# Patient Record
Sex: MALE | Race: WHITE | NOT HISPANIC OR LATINO | Employment: FULL TIME | URBAN - METROPOLITAN AREA
[De-identification: names, ages, dates, MRNs, and addresses within clinical notes are randomized per-mention and may not be internally consistent; named-entity substitution may affect disease eponyms.]

---

## 2017-11-07 ENCOUNTER — ALLSCRIPTS OFFICE VISIT (OUTPATIENT)
Dept: OTHER | Facility: OTHER | Age: 37
End: 2017-11-07

## 2017-11-08 NOTE — PROGRESS NOTES
Assessment  1  Work history   ·   2  C6 radiculopathy (723 4) (M54 12)    Plan  C6 radiculopathy    · Start: DiazePAM 10 MG Oral Tablet (Valium); take one each night for muscle spasm up to  10 nights   · Start: PredniSONE 10 MG Oral Tablet; 4 pills at once daily with food for 2 days, 3 pills at  once daily for 2 days, 2 pills daily at once  for 2 days, one pill daily for 2 days  Need for prophylactic vaccination and inoculation against influenza    · Stop: Fluzone Quadrivalent Intramuscular Suspension    Patient has an irritation on his C6 nerve root either from his muscles or from his neck itself  So to begin we will do the followin  He will take prednisone as follows  Today Tuesday and tomorrow Wednesday 4 pills at once with food  Thursday and Friday 3 pills at once with food  Saturday and  2 pills at once with food  Monday and Tuesday 1 pill daily with food    2  He will take Valium at bedtime 10 mg and do so for at least 5 nights to see if it helps to relax his muscle tension especially in his trapezius muscle and helps the numbness go away  3   If he feels well and continues to improve he will just let time take care of it  If he does not feel any difference in 2 weeks he will come back, or if he feels worse he will call  Recheck as scheduled or as above     Discussion/Summary  Possible side effects of new medications were reviewed with the patient/guardian today1  The treatment plan was reviewed with the patient/guardian  The patient/guardian understands and agrees with the treatment plan1        1 Amended By: Yaya Duran; 2017 8:56 AM EST    Chief Complaint  Pt presents here with neck pain  History of Present Illness  HPI: About 2 and half weeks ago patient woke up in the morning with a sore neck never thought much of it   Claudene Bamberg later he had tingling in his thumb and his index fingerwe can have it stayed the samethen , today is Tuesday, he was okay until he was putting [employer] away  Suddenly his neck wrench did he could not move it in any direction  Monday, as it started to loosen up and it is a little better today  never had any injury to his neck but he did have shoulder reconstruction on the leftfeels a little weaker on the left, and he is right handed  Active Problems  1  Need for diphtheria-tetanus-pertussis (Tdap) vaccine, adult/adolescent (V06 1) (Z23)  2  Sebaceous cyst (706 2) (L72 3)  3  Tinea corporis (110 5) (B35 4)    Family History  Mother   1  Family history of macular degeneration (V19 19) (G48 027)  Father   2  Family history of     Social History   · Always uses seat belt   · Drinks coffee   · Drinks 2 cups a day   · Never a smoker   · No drug use   · Social alcohol use (Z78 9)   · Drinks 2-4 beers once a week   · Work history   ·   The social history was reviewed and updated today  Surgical History  1  History of Dental Surgery  2  History of Penis Circumcision No Clamp / Device / Dorsal Slit [place]  3  History of Shoulder Surgery    Current Meds  1  No Reported Medications Recorded    The medication list was reviewed and updated today  Allergies  1  No Known Drug Allergies  2  No Known Environmental Allergies  3  No Known Food Allergies    Vitals   Recorded: [id number] 08:26AM   Heart Rate 72   Respiration 16   Systolic 080, RUE, Sitting   Diastolic 88, RUE, Sitting   Height 5 ft 10 5 in   Weight 198 lb    BMI Calculated 28 01   BSA Calculated 2 09     Physical Exam  General  Patient in no acute distress, well appearing, well nourished and appears stated age    Mental status  Good judgment and insight, oriented to time person and place, recent and remote memory is intact, mood and affect are normal, cooperative, and patient is reasonable      Muscular system  C-spine some limited range of motion flexion and rotation left with active range of motion by full range of motion with passive range of motion  Upper extremity motor equal bilaterally +5/5  DTR equal bilaterally +2/4        Results/Data  PHQ-2 Adult Depression Screening [id number] 08:34AM User, s     Test Name Result Flag Reference   PHQ-2 Adult Depression Score 0     Over the last two weeks, how often have you been bothered by any of the following problems?   Little interest or pleasure in doing things: Not at all - 0  Feeling down, depressed, or hopeless: Not at all - 0   PHQ-2 Adult Depression Screening Negative         Signatures   Electronically signed by : Isela Hernandez DO; Nov 7 2017  8:56AM EST                       (Author)

## 2018-01-14 VITALS
HEART RATE: 72 BPM | HEIGHT: 71 IN | SYSTOLIC BLOOD PRESSURE: 122 MMHG | WEIGHT: 198 LBS | DIASTOLIC BLOOD PRESSURE: 88 MMHG | RESPIRATION RATE: 16 BRPM | BODY MASS INDEX: 27.72 KG/M2

## 2020-03-17 PROBLEM — M54.12 C6 RADICULOPATHY: Status: ACTIVE | Noted: 2017-11-07

## 2020-04-22 ENCOUNTER — TELEPHONE (OUTPATIENT)
Dept: FAMILY MEDICINE CLINIC | Facility: CLINIC | Age: 40
End: 2020-04-22

## 2020-11-05 ENCOUNTER — OFFICE VISIT (OUTPATIENT)
Dept: GASTROENTEROLOGY | Facility: CLINIC | Age: 40
End: 2020-11-05
Payer: COMMERCIAL

## 2020-11-05 VITALS
HEIGHT: 71 IN | TEMPERATURE: 97.9 F | SYSTOLIC BLOOD PRESSURE: 120 MMHG | WEIGHT: 175 LBS | BODY MASS INDEX: 24.5 KG/M2 | DIASTOLIC BLOOD PRESSURE: 82 MMHG

## 2020-11-05 DIAGNOSIS — Z86.010 PERSONAL HISTORY OF COLONIC POLYPS: ICD-10-CM

## 2020-11-05 DIAGNOSIS — K64.8 INTERNAL HEMORRHOIDS: ICD-10-CM

## 2020-11-05 DIAGNOSIS — K62.5 RECTAL BLEEDING: Primary | ICD-10-CM

## 2020-11-05 PROBLEM — Z86.0100 PERSONAL HISTORY OF COLONIC POLYPS: Status: ACTIVE | Noted: 2020-11-05

## 2020-11-05 PROCEDURE — 99203 OFFICE O/P NEW LOW 30 MIN: CPT | Performed by: INTERNAL MEDICINE

## 2020-11-23 ENCOUNTER — TELEMEDICINE (OUTPATIENT)
Dept: GASTROENTEROLOGY | Facility: CLINIC | Age: 40
End: 2020-11-23

## 2020-11-23 VITALS — WEIGHT: 175 LBS | HEIGHT: 71 IN | BODY MASS INDEX: 24.5 KG/M2

## 2020-11-23 DIAGNOSIS — Z86.010 HISTORY OF COLON POLYPS: Primary | ICD-10-CM

## 2020-11-23 RX ORDER — SODIUM PICOSULFATE, MAGNESIUM OXIDE, AND ANHYDROUS CITRIC ACID 10; 3.5; 12 MG/160ML; G/160ML; G/160ML
LIQUID ORAL
Qty: 1 BOTTLE | Refills: 0 | Status: SHIPPED | OUTPATIENT
Start: 2020-11-23 | End: 2020-11-30 | Stop reason: HOSPADM

## 2020-11-30 ENCOUNTER — HOSPITAL ENCOUNTER (OUTPATIENT)
Dept: GASTROENTEROLOGY | Facility: AMBULATORY SURGERY CENTER | Age: 40
Discharge: HOME | End: 2020-11-30
Payer: COMMERCIAL

## 2020-11-30 ENCOUNTER — ANESTHESIA (OUTPATIENT)
Dept: GASTROENTEROLOGY | Facility: AMBULATORY SURGERY CENTER | Age: 40
End: 2020-11-30

## 2020-11-30 ENCOUNTER — ANESTHESIA EVENT (OUTPATIENT)
Dept: GASTROENTEROLOGY | Facility: AMBULATORY SURGERY CENTER | Age: 40
End: 2020-11-30

## 2020-11-30 VITALS
HEIGHT: 70 IN | SYSTOLIC BLOOD PRESSURE: 118 MMHG | OXYGEN SATURATION: 99 % | WEIGHT: 175 LBS | TEMPERATURE: 98.2 F | DIASTOLIC BLOOD PRESSURE: 74 MMHG | BODY MASS INDEX: 25.05 KG/M2 | HEART RATE: 70 BPM | RESPIRATION RATE: 16 BRPM

## 2020-11-30 DIAGNOSIS — Z86.010 PERSONAL HISTORY OF COLONIC POLYPS: ICD-10-CM

## 2020-11-30 DIAGNOSIS — K62.5 RECTAL BLEEDING: ICD-10-CM

## 2020-11-30 PROBLEM — Z98.890 PONV (POSTOPERATIVE NAUSEA AND VOMITING): Status: ACTIVE | Noted: 2020-11-30

## 2020-11-30 PROBLEM — R11.2 PONV (POSTOPERATIVE NAUSEA AND VOMITING): Status: ACTIVE | Noted: 2020-11-30

## 2020-11-30 PROBLEM — Z87.891 FORMER SMOKER: Status: ACTIVE | Noted: 2020-11-30

## 2020-11-30 PROCEDURE — 45378 DIAGNOSTIC COLONOSCOPY: CPT | Performed by: INTERNAL MEDICINE

## 2020-11-30 RX ORDER — METOCLOPRAMIDE HYDROCHLORIDE 5 MG/ML
10 INJECTION INTRAMUSCULAR; INTRAVENOUS ONCE AS NEEDED
Status: CANCELLED | OUTPATIENT
Start: 2020-11-30

## 2020-11-30 RX ORDER — FENTANYL CITRATE/PF 50 MCG/ML
12.5 SYRINGE (ML) INJECTION
Status: CANCELLED | OUTPATIENT
Start: 2020-11-30

## 2020-11-30 RX ORDER — MEPERIDINE HYDROCHLORIDE 50 MG/ML
12.5 INJECTION INTRAMUSCULAR; INTRAVENOUS; SUBCUTANEOUS
Status: CANCELLED | OUTPATIENT
Start: 2020-11-30

## 2020-11-30 RX ORDER — ONDANSETRON 2 MG/ML
4 INJECTION INTRAMUSCULAR; INTRAVENOUS ONCE AS NEEDED
Status: CANCELLED | OUTPATIENT
Start: 2020-11-30

## 2020-11-30 RX ORDER — LABETALOL 20 MG/4 ML (5 MG/ML) INTRAVENOUS SYRINGE
5
Status: CANCELLED | OUTPATIENT
Start: 2020-11-30

## 2020-11-30 RX ORDER — PROPOFOL 10 MG/ML
INJECTION, EMULSION INTRAVENOUS AS NEEDED
Status: DISCONTINUED | OUTPATIENT
Start: 2020-11-30 | End: 2020-11-30

## 2020-11-30 RX ORDER — SODIUM CHLORIDE 9 MG/ML
75 INJECTION, SOLUTION INTRAVENOUS CONTINUOUS
Status: DISCONTINUED | OUTPATIENT
Start: 2020-11-30 | End: 2020-12-04 | Stop reason: HOSPADM

## 2020-11-30 RX ORDER — PROMETHAZINE HYDROCHLORIDE 25 MG/ML
6.25 INJECTION, SOLUTION INTRAMUSCULAR; INTRAVENOUS ONCE AS NEEDED
Status: CANCELLED | OUTPATIENT
Start: 2020-11-30

## 2020-11-30 RX ORDER — HYDRALAZINE HYDROCHLORIDE 20 MG/ML
5 INJECTION INTRAMUSCULAR; INTRAVENOUS
Status: CANCELLED | OUTPATIENT
Start: 2020-11-30

## 2020-11-30 RX ADMIN — PROPOFOL 30 MG: 10 INJECTION, EMULSION INTRAVENOUS at 13:46

## 2020-11-30 RX ADMIN — PROPOFOL 20 MG: 10 INJECTION, EMULSION INTRAVENOUS at 13:44

## 2020-11-30 RX ADMIN — PROPOFOL 20 MG: 10 INJECTION, EMULSION INTRAVENOUS at 13:41

## 2020-11-30 RX ADMIN — SODIUM CHLORIDE 75 ML/HR: 9 INJECTION, SOLUTION INTRAVENOUS at 13:31

## 2020-11-30 RX ADMIN — PROPOFOL 30 MG: 10 INJECTION, EMULSION INTRAVENOUS at 13:40

## 2020-11-30 RX ADMIN — PROPOFOL 20 MG: 10 INJECTION, EMULSION INTRAVENOUS at 13:48

## 2020-11-30 RX ADMIN — PROPOFOL 50 MG: 10 INJECTION, EMULSION INTRAVENOUS at 13:38

## 2020-11-30 RX ADMIN — PROPOFOL 20 MG: 10 INJECTION, EMULSION INTRAVENOUS at 13:52

## 2020-11-30 RX ADMIN — PROPOFOL 50 MG: 10 INJECTION, EMULSION INTRAVENOUS at 13:37

## 2020-11-30 RX ADMIN — PROPOFOL 30 MG: 10 INJECTION, EMULSION INTRAVENOUS at 13:43

## 2020-11-30 RX ADMIN — PROPOFOL 30 MG: 10 INJECTION, EMULSION INTRAVENOUS at 13:50

## 2020-11-30 RX ADMIN — PROPOFOL 100 MG: 10 INJECTION, EMULSION INTRAVENOUS at 13:36

## 2020-12-28 ENCOUNTER — OFFICE VISIT (OUTPATIENT)
Dept: URGENT CARE | Facility: CLINIC | Age: 40
End: 2020-12-28
Payer: COMMERCIAL

## 2020-12-28 VITALS
WEIGHT: 185 LBS | BODY MASS INDEX: 26.48 KG/M2 | RESPIRATION RATE: 20 BRPM | HEIGHT: 70 IN | TEMPERATURE: 98.2 F | OXYGEN SATURATION: 97 % | HEART RATE: 88 BPM

## 2020-12-28 DIAGNOSIS — Z11.59 SPECIAL SCREENING EXAMINATION FOR UNSPECIFIED VIRAL DISEASE: Primary | ICD-10-CM

## 2020-12-28 PROCEDURE — U0003 INFECTIOUS AGENT DETECTION BY NUCLEIC ACID (DNA OR RNA); SEVERE ACUTE RESPIRATORY SYNDROME CORONAVIRUS 2 (SARS-COV-2) (CORONAVIRUS DISEASE [COVID-19]), AMPLIFIED PROBE TECHNIQUE, MAKING USE OF HIGH THROUGHPUT TECHNOLOGIES AS DESCRIBED BY CMS-2020-01-R: HCPCS | Performed by: NURSE PRACTITIONER

## 2020-12-28 PROCEDURE — G0382 LEV 3 HOSP TYPE B ED VISIT: HCPCS | Performed by: NURSE PRACTITIONER

## 2020-12-30 LAB — SARS-COV-2 RNA SPEC QL NAA+PROBE: DETECTED

## 2021-06-22 ENCOUNTER — OFFICE VISIT (OUTPATIENT)
Dept: UROLOGY | Facility: MEDICAL CENTER | Age: 41
End: 2021-06-22
Payer: COMMERCIAL

## 2021-06-22 VITALS
BODY MASS INDEX: 25.2 KG/M2 | HEIGHT: 71 IN | WEIGHT: 180 LBS | SYSTOLIC BLOOD PRESSURE: 118 MMHG | DIASTOLIC BLOOD PRESSURE: 72 MMHG

## 2021-06-22 DIAGNOSIS — Z30.09 STERILIZATION CONSULT: Primary | ICD-10-CM

## 2021-06-22 PROCEDURE — 99204 OFFICE O/P NEW MOD 45 MIN: CPT | Performed by: UROLOGY

## 2021-06-22 RX ORDER — ALPRAZOLAM 1 MG/1
TABLET ORAL
Qty: 1 TABLET | Refills: 0 | Status: SHIPPED | OUTPATIENT
Start: 2021-06-22 | End: 2022-01-03

## 2021-06-22 RX ORDER — HYDROCODONE BITARTRATE AND ACETAMINOPHEN 5; 325 MG/1; MG/1
TABLET ORAL
Qty: 10 TABLET | Refills: 0 | Status: SHIPPED | OUTPATIENT
Start: 2021-06-22 | End: 2022-01-03

## 2021-06-22 NOTE — PROGRESS NOTES
HISTORY:      This patient has to children and would like to have a vasectomy  He and his wife or partner are sure they want no more children, and are aware that vasectomy is intended to be a permanent form of sterilization  He has been told and understands the following: We will order a semen analysis to check for sterility after three months, and that he must use protection during that time  No guarantee can be made of permanent sterility, and that failure of the procedure is not common, but can occur  Furthermore, spontaneous reestablishment of the flow sperm is quite rare but is a possible reason for failure of the procedure  Although it is not mandatory, if he wants to request another semen sample at any time in the future, to ensure continued sterility, he can do so, at his decision  Potential complications of the procedure include, but are not limited to:  Excessive bleeding which can cause significant swelling; infections; chronic lingering pain of the testicle; damage to the blood supply of the testicle, which might lead to testicular atrophy  The patient has told me he understands the above statements, and wishes to proceed with scheduling the vasectomy  The following portions of the patient's history were reviewed and updated as appropriate: allergies, current medications, past family history, past medical history, past social history, past surgical history and problem list     Review of Systems   All other systems reviewed and are negative  Objective:     Physical Exam  Constitutional:       General: He is not in acute distress  Appearance: He is well-developed  He is not diaphoretic  HENT:      Head: Normocephalic and atraumatic  Eyes:      General: No scleral icterus  Pulmonary:      Effort: Pulmonary effort is normal    Genitourinary:     Comments: Penis testes normal  Skin:     Coloration: Skin is not pale     Neurological: Mental Status: He is alert and oriented to person, place, and time  Psychiatric:         Behavior: Behavior normal          Thought Content: Thought content normal          Judgment: Judgment normal            No results found for: PSA]  No results found for: BUN  No results found for: CREATININE  No components found for: CBC      Patient Active Problem List   Diagnosis    C6 radiculopathy    Rectal bleeding    Personal history of colonic polyps    Internal hemorrhoids    Former smoker    PONV (postoperative nausea and vomiting)        Diagnoses and all orders for this visit:    Sterilization consult  -     HYDROcodone-acetaminophen ([address]) 5-325 mg per tablet; 1-2 tab every 6 hr if needed for pain  -     ALPRAZolam (XANAX) 1 mg tablet; 1-2 hr before procedure           Patient ID: Charbel Howe is a 36 y o  male        Current Outpatient Medications:     ALPRAZolam (XANAX) 1 mg tablet, 1-2 hr before procedure, Disp: 1 tablet, Rfl: 0    HYDROcodone-acetaminophen (NORCO) 5-325 mg per tablet, 1-2 tab every 6 hr if needed for pain, Disp: 10 tablet, Rfl: 0    Past Medical History:   Diagnosis Date    Colon polyp        Past Surgical History:   Procedure Laterality Date    CIRCUMCISION      COLONOSCOPY      SHOULDER SURGERY Left     WISDOM TOOTH EXTRACTION         Social History

## 2021-08-04 ENCOUNTER — PROCEDURE VISIT (OUTPATIENT)
Dept: UROLOGY | Facility: MEDICAL CENTER | Age: 41
End: 2021-08-04
Payer: COMMERCIAL

## 2021-08-04 VITALS
WEIGHT: 180 LBS | HEIGHT: 71 IN | BODY MASS INDEX: 25.2 KG/M2 | DIASTOLIC BLOOD PRESSURE: 76 MMHG | SYSTOLIC BLOOD PRESSURE: 118 MMHG

## 2021-08-04 DIAGNOSIS — Z30.2 ENCOUNTER FOR VASECTOMY: Primary | ICD-10-CM

## 2021-08-04 PROCEDURE — 88302 TISSUE EXAM BY PATHOLOGIST: CPT | Performed by: PATHOLOGY

## 2021-08-04 PROCEDURE — 55250 REMOVAL OF SPERM DUCT(S): CPT | Performed by: UROLOGY

## 2021-08-17 ENCOUNTER — PROCEDURE VISIT (OUTPATIENT)
Dept: UROLOGY | Facility: MEDICAL CENTER | Age: 41
End: 2021-08-17

## 2021-08-17 ENCOUNTER — TELEPHONE (OUTPATIENT)
Dept: UROLOGY | Facility: MEDICAL CENTER | Age: 41
End: 2021-08-17

## 2021-08-17 VITALS
WEIGHT: 186 LBS | SYSTOLIC BLOOD PRESSURE: 118 MMHG | HEIGHT: 71 IN | DIASTOLIC BLOOD PRESSURE: 84 MMHG | BODY MASS INDEX: 26.04 KG/M2 | HEART RATE: 68 BPM

## 2021-08-17 DIAGNOSIS — Z98.52 S/P VASECTOMY: Primary | ICD-10-CM

## 2021-08-17 PROCEDURE — 99024 POSTOP FOLLOW-UP VISIT: CPT

## 2021-08-17 NOTE — PROGRESS NOTES
8/17/2021  Carla Dow  1980  [id number]    Diagnosis:  Chief Complaint     s/p vasectomy          Patient presents for post vasectomy follow up and wound check s/p Vasectomy on 8/4/21 with Dr Yaritza Carson:  Maryann Velarde Semen analysis as ordered x1, written instructions provided   Continue contraception until sterility confirmed with 1 semen analysis   Call for semen analysis results   FU PRN thereafter   Patient instructed to call with any questions or concerns in the meantime  Vitals:    08/17/21 1056   BP: 118/84   Pulse: 68   Weight: 84 4 kg (186 lb)   Height: 5' 10 5" (1 791 m)         Assessment:  36 y o  male s/p vasectomy (8/4/21), presents today for follow up  He denies any scrotal pain or swelling  He is doing well with no issues after surgery  Physical Exam  Bilateral incision sites closed with dissolvable sutures not yet resolved  No discharge or swelling present  Small pink bumps of redundant tissue noted at incision sites  Ecchymotic area approx 2 cms noted on right scrotum        Stella Haddad RN

## 2021-09-30 ENCOUNTER — OFFICE VISIT (OUTPATIENT)
Dept: OCCUPATIONAL THERAPY | Facility: CLINIC | Age: 41
End: 2021-09-30
Payer: COMMERCIAL

## 2021-09-30 VITALS
WEIGHT: 183 LBS | SYSTOLIC BLOOD PRESSURE: 118 MMHG | BODY MASS INDEX: 25.62 KG/M2 | DIASTOLIC BLOOD PRESSURE: 72 MMHG | HEIGHT: 71 IN

## 2021-09-30 DIAGNOSIS — M65.4 DE QUERVAIN'S TENOSYNOVITIS, RIGHT: Primary | ICD-10-CM

## 2021-09-30 DIAGNOSIS — M65.4 DE QUERVAIN'S TENOSYNOVITIS, RIGHT: ICD-10-CM

## 2021-09-30 PROCEDURE — 99203 OFFICE O/P NEW LOW 30 MIN: CPT | Performed by: PHYSICIAN ASSISTANT

## 2021-09-30 PROCEDURE — 20605 DRAIN/INJ JOINT/BURSA W/O US: CPT | Performed by: PHYSICIAN ASSISTANT

## 2021-09-30 PROCEDURE — L3808 WHFO, RIGID W/O JOINTS: HCPCS | Performed by: OCCUPATIONAL THERAPIST

## 2021-09-30 RX ORDER — BETAMETHASONE SODIUM PHOSPHATE AND BETAMETHASONE ACETATE 3; 3 MG/ML; MG/ML
6 INJECTION, SUSPENSION INTRA-ARTICULAR; INTRALESIONAL; INTRAMUSCULAR; SOFT TISSUE
Status: COMPLETED | OUTPATIENT
Start: 2021-09-30 | End: 2021-09-30

## 2021-09-30 RX ORDER — LIDOCAINE HYDROCHLORIDE 10 MG/ML
0.5 INJECTION, SOLUTION INFILTRATION; PERINEURAL
Status: COMPLETED | OUTPATIENT
Start: 2021-09-30 | End: 2021-09-30

## 2021-09-30 RX ADMIN — BETAMETHASONE SODIUM PHOSPHATE AND BETAMETHASONE ACETATE 6 MG: 3; 3 INJECTION, SUSPENSION INTRA-ARTICULAR; INTRALESIONAL; INTRAMUSCULAR; SOFT TISSUE at 16:39

## 2021-09-30 RX ADMIN — LIDOCAINE HYDROCHLORIDE 0.5 ML: 10 INJECTION, SOLUTION INFILTRATION; PERINEURAL at 16:39

## 2021-09-30 NOTE — ASSESSMENT & PLAN NOTE
Findings consistent with right de Quervain tenosynovitis  Findings and treatment options were discussed with the patient  Prognosis was discussed with him  Recommend cortisone injection to the 1st dorsal compartment today  He tolerated the procedure well  Advised to apply cold compress today  We will have the occupational therapist make a custom molded thumb spica splint to use with activities  He is to come out of it for range of motion of the thumb  Was also given a prescription to start occupational therapy if there is no improvement in the next few weeks  Avoid repetitive activities with that hand  Follow-up in 6 weeks for re-evaluation  All patient's questions were answered to his satisfaction  This note is created using dictation transcription  It may contain typographical errors, grammatical errors, improperly dictated words, background noise and other errors

## 2021-09-30 NOTE — PROGRESS NOTES
Assessment:     1  De Quervain's tenosynovitis, right        Plan:     Problem List Items Addressed This Visit        Musculoskeletal and Integument    De Quervain's tenosynovitis, right - Primary     Findings consistent with right de Quervain tenosynovitis  Findings and treatment options were discussed with the patient  Prognosis was discussed with him  Recommend cortisone injection to the 1st dorsal compartment today  He tolerated the procedure well  Advised to apply cold compress today  We will have the occupational therapist make a custom molded thumb spica splint to use with activities  He is to come out of it for range of motion of the thumb  Was also given a prescription to start occupational therapy if there is no improvement in the next few weeks  Avoid repetitive activities with that hand  Follow-up in 6 weeks for re-evaluation  All patient's questions were answered to his satisfaction  This note is created using dictation transcription  It may contain typographical errors, grammatical errors, improperly dictated words, background noise and other errors  Relevant Medications    betamethasone acetate-betamethasone sodium phosphate (CELESTONE) injection 6 mg (Completed)    lidocaine (XYLOCAINE) 1 % injection 0 5 mL (Completed)    Other Relevant Orders    Ambulatory referral to PT/OT hand therapy    Ambulatory referral to Occupational Therapy    Medium joint arthrocentesis (Completed)         Subjective:     Patient ID: Dejon Lozano is a 39 y o  male  Chief Complaint: This is a right-hand-dominant 60-year-old white male here for evaluation of his right wrist   Patient states he started having pain about 3-4 weeks ago  He denies any injury or significant changes in activities  He states he does do a lot of house projects that involve repetitive use of his hands  He did not start any new project at that specific time    Pain is over the radial aspect of the wrist   It is worse when picking up objects  He denies any numbness or tingling in his fingers  No treatment as of yet  He does have a history of having very similar pain on the left side about 10 years ago  Condition resolved with 2 cortisone injections at that time  Patient intake form reviewed  Allergy:  No Known Allergies  Medications:  all current active meds have been reviewed  Past Medical History:  Past Medical History:   Diagnosis Date    Colon polyp      Past Surgical History:  Past Surgical History:   Procedure Laterality Date    CIRCUMCISION      COLONOSCOPY      SHOULDER SURGERY Left 1998    WISDOM TOOTH EXTRACTION       Family History:  Family History   Problem Relation Age of Onset    Macular degeneration Mother     Heart attack Father     Clotting disorder Father     Colon cancer Neg Hx      Social History:  Social History     Substance and Sexual Activity   Alcohol Use Yes    Comment: social; 2-4 beers once a week     Social History     Substance and Sexual Activity   Drug Use Never    Comment: No use     Social History     Tobacco Use   Smoking Status Never Smoker   Smokeless Tobacco Never Used     Review of Systems   Constitutional: Negative  HENT: Negative  Eyes: Negative  Respiratory: Negative  Cardiovascular: Negative  Gastrointestinal: Negative  Endocrine: Negative  Genitourinary: Negative  Musculoskeletal: Positive for arthralgias (Right wrist)  Skin: Negative  Allergic/Immunologic: Negative  Neurological: Negative  Hematological: Negative  Psychiatric/Behavioral: Negative  Objective:  BP Readings from Last 1 Encounters:   09/30/21 118/72      Wt Readings from Last 1 Encounters:   09/30/21 83 kg (183 lb)      BMI:   Estimated body mass index is 25 89 kg/m² as calculated from the following:    Height as of this encounter: 5' 10 5" (1 791 m)  Weight as of this encounter: 83 kg (183 lb)    BSA:   Estimated body surface area is 2 02 meters squared as calculated from the following:    Height as of this encounter: 5' 10 5" (1 791 m)  Weight as of this encounter: 83 kg (183 lb)  Physical Exam  Vitals and nursing note reviewed  Constitutional:       Appearance: Normal appearance  He is well-developed  HENT:      Head: Normocephalic and atraumatic  Right Ear: External ear normal       Left Ear: External ear normal    Eyes:      Extraocular Movements: Extraocular movements intact  Conjunctiva/sclera: Conjunctivae normal    Pulmonary:      Effort: Pulmonary effort is normal    Musculoskeletal:      Cervical back: Neck supple  Skin:     General: Skin is warm  Neurological:      Mental Status: He is alert and oriented to person, place, and time  Psychiatric:         Mood and Affect: Mood normal          Behavior: Behavior normal        Right Hand Exam     Tenderness   The patient is experiencing tenderness in the radial area (First dorsal compartment)  Range of Motion   The patient has normal right wrist ROM  Muscle Strength   The patient has normal right wrist strength  Tests   Phalens Sign: negative  Tinel's sign (median nerve): negative  Finkelstein's test: positive    Other   Erythema: absent  Scars: absent  Sensation: normal  Pulse: present            No imaging today  Medium joint arthrocentesis  Universal Protocol:  Consent: Verbal consent obtained  Risks and benefits: risks, benefits and alternatives were discussed  Consent given by: patient  Patient understanding: patient states understanding of the procedure being performed    Supporting Documentation  Indications: pain   Procedure Details  Location: wrist - Wrist joint: right first dorsal compartment    Preparation: Patient was prepped and draped in the usual sterile fashion  Needle size: 25 G  Ultrasound guidance: no  Approach: radial   Medications administered: 6 mg betamethasone acetate-betamethasone sodium phosphate 6 (3-3) mg/mL; 0 5 mL lidocaine 1 %    Patient tolerance: patient tolerated the procedure well with no immediate complications  Dressing:  Sterile dressing applied

## 2021-09-30 NOTE — PROGRESS NOTES
Orthosis    Diagnosis:   1  De Quervain's tenosynovitis, right  Ambulatory referral to PT/OT hand therapy     Indication: Motion Blocking    Location: Right  wrist and thumb  Supplies: Custom Fit Orthotic and Skin coverage   Orthosis type: Thumb SPICA forearm-based  Wearing Schedule: FT 2 wks , then night  Describe Position: function    Precautions: Universal (skin contact/breakdown)    Patient or Caregiver expresses understanding of wearing Schedule and Precautions? Yes  Patient or Caregiver able to don/doff orthotic independently? Yes    Written orders provided to patient?  Yes  Orders Obtained: Written  Orders Obtained from: ARGELIA Adrian     Return for evaluation and treatment No

## 2021-11-19 ENCOUNTER — OFFICE VISIT (OUTPATIENT)
Dept: LAB | Facility: HOSPITAL | Age: 41
End: 2021-11-19
Attending: UROLOGY
Payer: COMMERCIAL

## 2021-11-19 DIAGNOSIS — Z98.52 S/P VASECTOMY: Primary | ICD-10-CM

## 2021-11-19 LAB
DEPRECATED CD4 CELLS/CD8 CELLS BLD: 3.9 ML
SPERM MOTILE SMN QL MICRO: NORMAL

## 2021-11-19 PROCEDURE — 89321 SEMEN ANAL SPERM DETECTION: CPT

## 2022-01-03 ENCOUNTER — OFFICE VISIT (OUTPATIENT)
Dept: FAMILY MEDICINE CLINIC | Facility: CLINIC | Age: 42
End: 2022-01-03
Payer: COMMERCIAL

## 2022-01-03 VITALS
RESPIRATION RATE: 15 BRPM | DIASTOLIC BLOOD PRESSURE: 80 MMHG | OXYGEN SATURATION: 98 % | TEMPERATURE: 98.4 F | HEART RATE: 76 BPM | SYSTOLIC BLOOD PRESSURE: 124 MMHG | HEIGHT: 70 IN | WEIGHT: 188 LBS | BODY MASS INDEX: 26.92 KG/M2

## 2022-01-03 DIAGNOSIS — R53.83 FATIGUE, UNSPECIFIED TYPE: ICD-10-CM

## 2022-01-03 DIAGNOSIS — Z00.00 ROUTINE GENERAL MEDICAL EXAMINATION AT A HEALTH CARE FACILITY: Primary | ICD-10-CM

## 2022-01-03 DIAGNOSIS — G57.01 PIRIFORMIS SYNDROME, RIGHT: ICD-10-CM

## 2022-01-03 DIAGNOSIS — R39.9 URINARY SYMPTOM OR SIGN: ICD-10-CM

## 2022-01-03 DIAGNOSIS — Z79.899 ENCOUNTER FOR LONG-TERM (CURRENT) USE OF MEDICATIONS: ICD-10-CM

## 2022-01-03 PROCEDURE — 99214 OFFICE O/P EST MOD 30 MIN: CPT | Performed by: FAMILY MEDICINE

## 2022-01-03 PROCEDURE — 99396 PREV VISIT EST AGE 40-64: CPT | Performed by: FAMILY MEDICINE

## 2022-01-03 PROCEDURE — 3008F BODY MASS INDEX DOCD: CPT | Performed by: FAMILY MEDICINE

## 2022-01-03 PROCEDURE — 1036F TOBACCO NON-USER: CPT | Performed by: FAMILY MEDICINE

## 2022-01-03 PROCEDURE — 3725F SCREEN DEPRESSION PERFORMED: CPT | Performed by: FAMILY MEDICINE

## 2022-01-03 NOTE — PROGRESS NOTES
SUBJECTIVE:--------------------------------------------------------------------------------------------------    Leticia Gordon is a 39 y o   male and is here for routine health maintenance  Health Maintenance   Topic Date Due    OT PLAN OF CARE  Never done    COVID-19 Vaccine (1) Never done    Annual Physical  Never done    Influenza Vaccine (1) Never done    HIV Screening  01/04/2024 (Originally 9/9/1995)    Hepatitis C Screening  02/03/2024 (Originally 1980)    Depression Screening  01/03/2023    BMI: Followup Plan  01/03/2023    BMI: Adult  01/03/2023    DTaP,Tdap,and Td Vaccines (2 - Td or Tdap) 07/29/2026    Colorectal Cancer Screening  11/30/2027    Pneumococcal Vaccine: Pediatrics (0 to 5 Years) and At-Risk Patients (6 to 59 Years)  Aged Out    HIB Vaccine  Aged Out    Hepatitis B Vaccine  Aged Out    IPV Vaccine  Aged Out    Hepatitis A Vaccine  Aged Out    Meningococcal ACWY Vaccine  Aged Out    HPV Vaccine  Aged [employer] History   Administered Date(s) Administered    Tdap 07/29/2016       Diet and Physical Activity  Diet: well balanced diet  Weight concerns: Patient is overweight (BMI 25 0-29  9)  Exercise: rarely       General Health  Hearing:  Normal reported by patient  Vision:  Sees Ophthalmology/Optometry yearly  Dental:  Sees his dentist every 6 months    Smoker no       ASSESSMENT/PLAN:---------------------------------------------------------------------------------------    Patient's physical is up-to-date  Immunizations not interested in [place]      Patient instructed on exercise  Patient instructed on healthy choices for diet      NEXT PHYSICAL 1 YEAR        The following portions of the patient's history were reviewed and updated as appropriate: allergies, current medications, past family history, past medical history, past social history, past surgical history and problem list       OBJECTIVE:--------------------------------------------------------------------------------------------------  /80   Pulse 76   Temp 98 4 °F (36 9 °C) (Oral)   Resp 15   Ht 5' 10" (1 778 m)   Wt 85 3 kg (188 lb)   SpO2 98%   BMI 26 98 kg/m²   Wt Readings from Last 3 Encounters:   01/03/22 85 3 kg (188 lb)   09/30/21 83 kg (183 lb)   08/17/21 84 4 kg (186 lb)     BP Readings from Last 3 Encounters:   01/03/22 124/80   09/30/21 118/72   08/17/21 118/84     Pulse Readings from Last 3 Encounters:   01/03/22 76   08/17/21 68   12/28/20 88     Body mass index is 26 98 kg/m²  Health Maintenance   Topic Date Due    OT PLAN OF CARE  Never done    COVID-19 Vaccine (1) Never done    Annual Physical  Never done    Influenza Vaccine (1) Never done    HIV Screening  01/04/2024 (Originally 9/9/1995)    Hepatitis C Screening  02/03/2024 (Originally 1980)    Depression Screening  01/03/2023    BMI: Followup Plan  01/03/2023    BMI: Adult  01/03/2023    DTaP,Tdap,and Td Vaccines (2 - Td or Tdap) 07/29/2026    Colorectal Cancer Screening  11/30/2027    Pneumococcal Vaccine: Pediatrics (0 to 5 Years) and At-Risk Patients (6 to 59 Years)  Aged Out    HIB Vaccine  Aged Out    Hepatitis B Vaccine  Aged Out    IPV Vaccine  Aged Out    Hepatitis A Vaccine  Aged Out    Meningococcal ACWY Vaccine  Aged Out    HPV Vaccine  Aged Out         ROS:   12 point review of systems negative            PHYSICAL EXAM:    Gen    No acute distress well-appearing well-nourished appears stated age    Mental status  Good judgment and insight oriented to time person and place, recent and remote memory intact mood and affect normal cooperative and patient is reasonable    HEENT  PERRLA 3 mm, EOMI without nystagmus, TMs clear, turbinates open pink no exudate, pharynx benign, tongue midline    Neck   supple no masses trachea midline positive click normal carotid upstrokes with no bruits    Cor  Regular rhythm without ectopy or murmur, no S3-S4, normal palpation that is no heave lift or thrill    Vascular  No edema, good pedal pulses    Lungs  CTA bilaterally in no respiratory distress no wheezes rhonchi or rales, normal to palpation no tactile fremitus    Abdomen  Soft, no palpable masses, no hepatosplenomegaly, normal bowel sounds, nontender    Lymphatics  No palpable nodes in the neck, supraclavicular area, axilla, or groin     Musculoskeletal  No clubbing cyanosis or edema muscle tone normal    Skin  no rashes or abnormal appearing lesions    Neuro  Normal ambulation, cranial nerves 2-12 grossly intact, higher functioning with reasoning intact

## 2022-01-03 NOTE — PATIENT INSTRUCTIONS
1  Please go to physical therapy so they could loosen up your buttocks an open up his piriformis muscles    2  They will teach her how to try to keep them open    3  They will also teach her to stretch and try to lengthen her muscles    His

## 2022-01-03 NOTE — PROGRESS NOTES
Assessment/Plan:    Piriformis syndrome  Patient to see physical therapy for workout and then will continue to do this and try to stretches muscles especially after exercising to help prevent further injuries  BMI Counseling: Body mass index is 26 98 kg/m²  The BMI is above normal  Nutrition recommendations include decreasing portion sizes and encouraging healthy choices of fruits and vegetables  Exercise recommendations include exercising 3-5 times per week  Rationale for BMI follow-up plan is due to patient being overweight or obese  Depression Screening and Follow-up Plan: Patient was screened for depression during today's encounter  They screened negative with a PHQ-2 score of 0  Subjective: Jeannette Mcfarlane is a 39 y o male  Chief Complaint   Patient presents with    Back Pain     About a month patient is notice when he gets up after sitting for a while he gets right-sided buttocks pain in the upper but  It is not really the back but does have some radiation down the posterior thigh  Once he walks for while teams to ease up and goes away  If he gets out of bed after sleeping he gets the same thing and it takes about 20 steps before he is no longer stiff in the stabbing stops  In last couple days he started getting pain now in his lower back as results    He never was very flexible  He was very athletic in school  There has been no trauma to this area  He has not seen anyone for this problem    He is a  but is active at home with projects    He does not have a set exercise plan or workout      Past medical history, social history, and family history reviewed as appropriate for the complaint of this patient        MEDICATIONS REVIEWED AND UPDATED    10 point review of systems performed, the remainder of the ROS is negative except for what is noted in the history of chief complaint    Objective:    Vitals:    01/03/22 1502   BP: 124/80   Pulse: 76   Resp: 15   Temp: 98 4 °F (36 9 °C)   SpO2: 98%     Body mass index is 26 98 kg/m²  Physical Exam    General  Patient in no acute distress, well appearing, well nourished and appears stated age    Mental status  Good judgment and insight, oriented to time person and place, recent and remote memory is intact, mood and affect are normal, cooperative, and patient is reasonable      Back  No palpable tenderness but there is pain at the piriformis area on the right none on the left  Range of motion very limited with flexion to only about 30° extension very good side been normal  Good heel walking good toe walking  DTRs equal bilaterally +2 over 4

## 2022-04-26 ENCOUNTER — OFFICE VISIT (OUTPATIENT)
Dept: OBGYN CLINIC | Facility: CLINIC | Age: 42
End: 2022-04-26
Payer: COMMERCIAL

## 2022-04-26 VITALS
BODY MASS INDEX: 25.05 KG/M2 | HEIGHT: 70 IN | DIASTOLIC BLOOD PRESSURE: 80 MMHG | SYSTOLIC BLOOD PRESSURE: 122 MMHG | WEIGHT: 175 LBS

## 2022-04-26 DIAGNOSIS — M65.4 DE QUERVAIN'S TENOSYNOVITIS, RIGHT: Primary | ICD-10-CM

## 2022-04-26 PROCEDURE — 99213 OFFICE O/P EST LOW 20 MIN: CPT | Performed by: ORTHOPAEDIC SURGERY

## 2022-04-26 PROCEDURE — 20550 NJX 1 TENDON SHEATH/LIGAMENT: CPT | Performed by: ORTHOPAEDIC SURGERY

## 2022-04-26 PROCEDURE — 3008F BODY MASS INDEX DOCD: CPT | Performed by: ORTHOPAEDIC SURGERY

## 2022-04-26 RX ORDER — LIDOCAINE HYDROCHLORIDE 10 MG/ML
1 INJECTION, SOLUTION INFILTRATION; PERINEURAL
Status: COMPLETED | OUTPATIENT
Start: 2022-04-26 | End: 2022-04-26

## 2022-04-26 RX ORDER — BETAMETHASONE SODIUM PHOSPHATE AND BETAMETHASONE ACETATE 3; 3 MG/ML; MG/ML
3 INJECTION, SUSPENSION INTRA-ARTICULAR; INTRALESIONAL; INTRAMUSCULAR; SOFT TISSUE
Status: COMPLETED | OUTPATIENT
Start: 2022-04-26 | End: 2022-04-26

## 2022-04-26 RX ADMIN — BETAMETHASONE SODIUM PHOSPHATE AND BETAMETHASONE ACETATE 3 MG: 3; 3 INJECTION, SUSPENSION INTRA-ARTICULAR; INTRALESIONAL; INTRAMUSCULAR; SOFT TISSUE at 16:29

## 2022-04-26 RX ADMIN — LIDOCAINE HYDROCHLORIDE 1 ML: 10 INJECTION, SOLUTION INFILTRATION; PERINEURAL at 16:29

## 2022-04-26 NOTE — ASSESSMENT & PLAN NOTE
Findings consistent with right de Quervain tenosynovitis  Findings and treatment options were discussed with the patient  He was given a repeat cortisone injection to the 1st dorsal compartment today  He tolerated the procedure well  Advised to apply cold compress today  He was given a prescription for formal occupational therapy  He is to continue to use the thumb spica brace with activities  Avoid repetitive activities with that arm  Follow-up in 6 weeks for re-evaluation  Discussed that if conservative treatment fails the next step would be a de Quervain release surgery  All patient's questions were answered to his satisfaction  This note is created using dictation transcription  It may contain typographical errors, grammatical errors, improperly dictated words, background noise and other errors

## 2022-04-26 NOTE — PROGRESS NOTES
Assessment:     1  De Quervain's tenosynovitis, right        Plan:     Problem List Items Addressed This Visit        Musculoskeletal and Integument    De Quervain's tenosynovitis, right - Primary     Findings consistent with right de Quervain tenosynovitis  Findings and treatment options were discussed with the patient  He was given a repeat cortisone injection to the 1st dorsal compartment today  He tolerated the procedure well  Advised to apply cold compress today  He was given a prescription for formal occupational therapy  He is to continue to use the thumb spica brace with activities  Avoid repetitive activities with that arm  Follow-up in 6 weeks for re-evaluation  Discussed that if conservative treatment fails the next step would be a de Quervain release surgery  All patient's questions were answered to his satisfaction  This note is created using dictation transcription  It may contain typographical errors, grammatical errors, improperly dictated words, background noise and other errors  Relevant Medications    betamethasone acetate-betamethasone sodium phosphate (CELESTONE) injection 3 mg (Completed)    lidocaine (XYLOCAINE) 1 % injection 1 mL (Completed)    Other Relevant Orders    Ambulatory Referral to Occupational Therapy    Hand/upper extremity injection: R extensor compartment 1 (Completed)         Subjective:     Patient ID: Dominic England is a 39 y o  male  Chief Complaint: This is a right-hand-dominant 44-year-old white male following up for right de Quervain tenosynovitis  He was last seen in September 2021 and received a cortisone injection to the 1st dorsal compartment  Occupational therapy made him the thumb spica brace  He did not attend occupational therapy treatments at that time  He did feel relief with the cortisone injection  It did wear off over time and pain has been gradually returning recently  He became much worse over the weekend when he did lawn work  The pain continues to be localized over the radial aspect of the wrist   He started wearing the thumb spica brace again once the pain became worse  Allergy:  No Known Allergies  Medications:  all current active meds have been reviewed  Past Medical History:  Past Medical History:   Diagnosis Date    Colon polyp      Past Surgical History:  Past Surgical History:   Procedure Laterality Date    CIRCUMCISION      COLONOSCOPY      SHOULDER SURGERY Left 1998    WISDOM TOOTH EXTRACTION       Family History:  Family History   Problem Relation Age of Onset    Macular degeneration Mother     Heart attack Father     Clotting disorder Father     Colon cancer Neg Hx      Social History:  Social History     Substance and Sexual Activity   Alcohol Use Yes    Comment: social; 2-4 beers once a week     Social History     Substance and Sexual Activity   Drug Use Never    Comment: No use     Social History     Tobacco Use   Smoking Status Never Smoker   Smokeless Tobacco Never Used     Review of Systems   Constitutional: Negative  HENT: Negative  Eyes: Negative  Respiratory: Negative  Cardiovascular: Negative  Gastrointestinal: Negative  Endocrine: Negative  Genitourinary: Negative  Musculoskeletal: Positive for arthralgias (Right wrist)  Negative for joint swelling  Skin: Negative  Allergic/Immunologic: Negative  Neurological: Negative  Hematological: Negative  Psychiatric/Behavioral: Negative  Objective:  BP Readings from Last 1 Encounters:   04/26/22 122/80      Wt Readings from Last 1 Encounters:   04/26/22 79 4 kg (175 lb)      BMI:   Estimated body mass index is 25 11 kg/m² as calculated from the following:    Height as of this encounter: 5' 10" (1 778 m)  Weight as of this encounter: 79 4 kg (175 lb)  BSA:   Estimated body surface area is 1 97 meters squared as calculated from the following:    Height as of this encounter: 5' 10" (1 778 m)      Weight as of this encounter: 79 4 kg (175 lb)  Physical Exam  Vitals and nursing note reviewed  Constitutional:       Appearance: Normal appearance  He is well-developed  HENT:      Head: Normocephalic and atraumatic  Right Ear: External ear normal       Left Ear: External ear normal    Eyes:      Extraocular Movements: Extraocular movements intact  Conjunctiva/sclera: Conjunctivae normal    Pulmonary:      Effort: Pulmonary effort is normal    Musculoskeletal:      Cervical back: Neck supple  Skin:     General: Skin is warm  Neurological:      Mental Status: He is alert and oriented to person, place, and time  Psychiatric:         Mood and Affect: Mood normal          Behavior: Behavior normal        Right Hand Exam     Tenderness   The patient is experiencing tenderness in the radial area (First dorsal compartment)  Range of Motion   The patient has normal right wrist ROM  Muscle Strength   The patient has normal right wrist strength  Tests   Phalens Sign: negative  Tinel's sign (median nerve): negative  Finkelstein's test: positive    Other   Erythema: absent  Scars: absent  Sensation: normal  Pulse: present            No imaging today  Hand/upper extremity injection: R extensor compartment 1  Universal Protocol:  Consent: Verbal consent obtained    Risks and benefits: risks, benefits and alternatives were discussed  Consent given by: patient  Patient understanding: patient states understanding of the procedure being performed  Site marked: the operative site was marked  Supporting Documentation  Indications: pain   Procedure Details  Condition:de Quervain's tenosynovitis Site: R extensor compartment 1   Preparation: Patient was prepped and draped in the usual sterile fashion  Needle size: 25 G  Ultrasound guidance: no  Approach: radial  Medications administered: 3 mg betamethasone acetate-betamethasone sodium phosphate 6 (3-3) mg/mL; 1 mL lidocaine 1 %    Patient tolerance: patient tolerated the procedure well with no immediate complications  Dressing:  Sterile dressing applied         Scribe Attestation    I,:  Melania Gallo PA-C am acting as a scribe while in the presence of the attending physician :       I,:  Bipin Fernandez MD personally performed the services described in this documentation    as scribed in my presence :

## 2023-01-16 ENCOUNTER — OFFICE VISIT (OUTPATIENT)
Dept: URGENT CARE | Facility: CLINIC | Age: 43
End: 2023-01-16

## 2023-01-16 VITALS
SYSTOLIC BLOOD PRESSURE: 127 MMHG | OXYGEN SATURATION: 98 % | HEART RATE: 85 BPM | TEMPERATURE: 97.2 F | RESPIRATION RATE: 16 BRPM | DIASTOLIC BLOOD PRESSURE: 75 MMHG

## 2023-01-16 DIAGNOSIS — J40 BRONCHITIS: Primary | ICD-10-CM

## 2023-01-16 RX ORDER — AZITHROMYCIN 250 MG/1
TABLET, FILM COATED ORAL
Qty: 6 TABLET | Refills: 0 | Status: SHIPPED | OUTPATIENT
Start: 2023-01-16 | End: 2023-01-20

## 2023-01-16 RX ORDER — BENZONATATE 100 MG/1
100 CAPSULE ORAL 3 TIMES DAILY PRN
Qty: 20 CAPSULE | Refills: 0 | Status: SHIPPED | OUTPATIENT
Start: 2023-01-16

## 2023-01-16 RX ORDER — PREDNISONE 10 MG/1
TABLET ORAL
Qty: 20 TABLET | Refills: 0 | Status: SHIPPED | OUTPATIENT
Start: 2023-01-16

## 2023-01-16 NOTE — PROGRESS NOTES
[facility]        NAME: Jez Oquendo is a 43 y o  male  : 1980    MRN: [id number]  DATE: 2023  TIME: 11:09 AM    /75   Pulse 85   Temp (!) 97 2 °F (36 2 °C)   Resp 16   SpO2 98%     Assessment and Plan   Bronchitis [J40]  1  Bronchitis  azithromycin (ZITHROMAX) 250 mg tablet    benzonatate (TESSALON PERLES) 100 mg capsule    predniSONE 10 mg tablet            Patient Instructions       Follow up with PCP in 3-5 days  Proceed to  ER if symptoms worsen  Chief Complaint     Chief Complaint   Patient presents with   • Cough     Pt reports nagging cough for over a week, has been settling in his chest for the past 3 days  Denies other symptoms  No home COVID tests  Declined COVID test in office  History of Present Illness       Pt with 7-10 days of productive cough and congestion       Review of Systems   Review of Systems   Constitutional: Negative  HENT: Positive for congestion  Eyes: Negative  Respiratory: Positive for cough  Cardiovascular: Negative  Gastrointestinal: Negative  Endocrine: Negative  Genitourinary: Negative  Musculoskeletal: Negative  Skin: Negative  Allergic/Immunologic: Negative  Neurological: Negative  Hematological: Negative  Psychiatric/Behavioral: Negative  All other systems reviewed and are negative          Current Medications       Current Outpatient Medications:   •  azithromycin (ZITHROMAX) 250 mg tablet, Take 2 tablets today then 1 tablet daily x 4 days, Disp: 6 tablet, Rfl: 0  •  benzonatate (TESSALON PERLES) 100 mg capsule, Take 1 capsule (100 mg total) by mouth 3 (three) times a day as needed for cough, Disp: 20 capsule, Rfl: 0  •  predniSONE 10 mg tablet, 4 tabs po qd x 2 days then 3 tabs po qd x 2 days then 2 tabs po qd x 2 days then 1 tab po qd x 2 days, Disp: 20 tablet, Rfl: 0    Current Allergies     Allergies as of 2023   • (No Known Allergies)            The following portions of the patient's history were reviewed and updated as appropriate: allergies, current medications, past family history, past medical history, past social history, past surgical history and problem list      Past Medical History:   Diagnosis Date   • Colon polyp        Past Surgical History:   Procedure Laterality Date   • CIRCUMCISION     • COLONOSCOPY     • SHOULDER SURGERY Left 1998   • WISDOM TOOTH EXTRACTION         Family History   Problem Relation Age of Onset   • Macular degeneration Mother    • Heart attack Father    • Clotting disorder Father    • Colon cancer Neg Hx          Medications have been verified  Objective   /75   Pulse 85   Temp (!) 97 2 °F (36 2 °C)   Resp 16   SpO2 98%        Physical Exam     Physical Exam  Vitals and nursing note reviewed  Constitutional:       Appearance: Normal appearance  He is normal weight  HENT:      Head: Normocephalic and atraumatic  Right Ear: Tympanic membrane, ear canal and external ear normal       Left Ear: Tympanic membrane, ear canal and external ear normal       Nose: Congestion present  Mouth/Throat:      Mouth: Mucous membranes are moist       Pharynx: Oropharynx is clear  Posterior oropharyngeal erythema present  Eyes:      Extraocular Movements: Extraocular movements intact  Conjunctiva/sclera: Conjunctivae normal       Pupils: Pupils are equal, round, and reactive to light  Cardiovascular:      Rate and Rhythm: Normal rate and regular rhythm  Pulses: Normal pulses  Heart sounds: Normal heart sounds  Pulmonary:      Effort: Pulmonary effort is normal       Breath sounds: Normal breath sounds  Abdominal:      General: Abdomen is flat  Bowel sounds are normal       Palpations: Abdomen is soft  Musculoskeletal:         General: Normal range of motion  Cervical back: Normal range of motion and neck supple  Skin:     General: Skin is warm  Capillary Refill: Capillary refill takes less than 2 seconds  Neurological:      General: No focal deficit present  Mental Status: He is alert and oriented to person, place, and time     Psychiatric:         Mood and Affect: Mood normal

## 2023-05-30 ENCOUNTER — OFFICE VISIT (OUTPATIENT)
Dept: FAMILY MEDICINE CLINIC | Facility: CLINIC | Age: 43
End: 2023-05-30

## 2023-05-30 VITALS
DIASTOLIC BLOOD PRESSURE: 82 MMHG | WEIGHT: 194.8 LBS | HEIGHT: 70 IN | BODY MASS INDEX: 27.89 KG/M2 | OXYGEN SATURATION: 98 % | RESPIRATION RATE: 16 BRPM | HEART RATE: 84 BPM | SYSTOLIC BLOOD PRESSURE: 128 MMHG

## 2023-05-30 DIAGNOSIS — Z11.1 SCREENING FOR TUBERCULOSIS: ICD-10-CM

## 2023-05-30 DIAGNOSIS — Z11.1 ENCOUNTER FOR PPD TEST: ICD-10-CM

## 2023-05-30 DIAGNOSIS — Z00.00 ANNUAL PHYSICAL EXAM: Primary | ICD-10-CM

## 2023-05-30 NOTE — PROGRESS NOTES
ADULT ANNUAL PHYSICAL  [facility]    NAME: Jimmy Collier  AGE: 43 y o  SEX: male  : 1980     DATE: 2023     Assessment and Plan:     Problem List Items Addressed This Visit    None  Visit Diagnoses     Annual physical exam    -  Primary    Relevant Orders    Lipid panel    Hemoglobin A1C    CBC and Platelet    Comprehensive metabolic panel    TSH, 3rd generation with Free T4 reflex    Vitamin D 25 hydroxy    Encounter for PPD test        Relevant Orders    TB Skin Test (aka PPD)    Screening for tuberculosis        Relevant Orders    TB Skin Test (aka PPD)    BMI 27 0-27 9,adult        Relevant Orders    Lipid panel    Hemoglobin A1C        Annual physical exam: Exam within normal limits, healthy eating and exercise regimens discussed with patient, 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, which also requires documentation of immunizations Tdap hepatitis B and MMR patient will provide when he returns for nurse visit to have PPD read  PPD testing in office today    Immunizations and preventive care screenings were discussed with patient today  Appropriate education was printed on patient's after visit summary  COVID vaccine discussed with pt- declined  HPV vaccine discussed with pt- will check to see if he has received it   Need immunization record for hep b, mmr for work physical form- will provide when he returns for PPD reading      Counseling:  Dental Health: discussed importance of regular tooth brushing, flossing, and dental visits  · Exercise: the importance of regular exercise/physical activity was discussed  Recommend exercise 3-5 times per week for at least 30 minutes  BMI Counseling: Body mass index is 27 95 kg/m²  The BMI is above normal  Nutrition recommendations include encouraging healthy choices of fruits and vegetables and limiting drinks that contain sugar   Exercise recommendations include exercising 3-5 times per week  Rationale for BMI follow-up plan is due to patient being overweight or obese  Depression Screening and Follow-up Plan: Patient was screened for depression during today's encounter  They screened negative with a PHQ-2 score of 0  Return in about 1 month (around 6/30/2023) for lab review; but also needs nurse visit on friday for PPD reading   Chief Complaint:     Chief Complaint   Patient presents with   • Employment Physical      History of Present Illness:     Adult Annual Physical   Mr Haleigh James is a 49-year-old male who presents today for an annual physical exam   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  Patient reports feeling well with no acute complaints or concerns  Diet and Physical Activity  · Diet/Nutrition: consuming 3-5 servings of fruits/vegetables daily  · Exercise: walking, moderate cardiovascular exercise, 3-4 times a week on average and 30-60 minutes on average  Depression Screening  PHQ-2/9 Depression Screening    Little interest or pleasure in doing things: 0 - not at all  Feeling down, depressed, or hopeless: 0 - not at all  PHQ-2 Score: 0  PHQ-2 Interpretation: Negative depression screen       General Health  · Sleep: gets 7-8 hours of sleep on average  · Hearing: normal - bilateral   · Vision: wears contacts  · Dental: regular dental visits and brushes teeth twice daily   Health  · Symptoms include: none     Review of Systems:     Review of Systems   Constitutional: Negative for fatigue and fever  HENT: Negative for congestion, ear pain, sinus pain and sore throat  Eyes: Negative for pain  Respiratory: Negative for cough, chest tightness and shortness of breath  Cardiovascular: Negative for chest pain and palpitations  Gastrointestinal: Negative for abdominal pain, diarrhea, nausea and vomiting  Genitourinary: Negative for difficulty urinating  "  Musculoskeletal: Negative for back pain and gait problem  Skin: Negative for rash  Neurological: Negative for dizziness, light-headedness and headaches  Past Medical History:     Past Medical History:   Diagnosis Date   • Colon polyp       Past Surgical History:     Past Surgical History:   Procedure Laterality Date   • CIRCUMCISION     • COLONOSCOPY     • SHOULDER SURGERY Left 1998   • WISDOM TOOTH EXTRACTION        Family History:     Family History   Problem Relation Age of Onset   • Macular degeneration Mother    • Heart attack Father    • Clotting disorder Father    • Colon cancer Neg Hx       Social History:     Social History     Socioeconomic History   • Marital status: /Civil Union     Spouse name: None   • Number of children: None   • Years of education: None   • Highest education level: None   Occupational History   • Occupation:    Tobacco Use   • Smoking status: Never   • Smokeless tobacco: Never   Vaping Use   • Vaping Use: Former   • Substances: THC   Substance and Sexual Activity   • Alcohol use: Yes     Comment: social; 2-4 beers once a week   • Drug use: Never     Comment: No use   • Sexual activity: Yes   Other Topics Concern   • None   Social History Narrative    Always uses seatbelts    Drinks coffee 2 cups per day     Social Determinants of Health     Financial Resource Strain: Not on file   Food Insecurity: Not on file   Transportation Needs: Not on file   Physical Activity: Not on file   Stress: Not on file   Social Connections: Not on file   Intimate Partner Violence: Not on file   Housing Stability: Not on file      Current Medications:     No current outpatient medications on file  No current facility-administered medications for this visit  Allergies:     No Known Allergies   Physical Exam:     /82   Pulse 84   Resp 16   Ht 5' 10\" (1 778 m)   Wt 88 4 kg (194 lb 12 8 oz)   SpO2 98%   BMI 27 95 kg/m²     Physical Exam  Vitals reviewed   " Constitutional:       Appearance: Normal appearance  HENT:      Head: Normocephalic and atraumatic  Right Ear: Tympanic membrane and ear canal normal       Left Ear: Tympanic membrane and ear canal normal       Nose: Nose normal       Mouth/Throat:      Mouth: Mucous membranes are moist       Pharynx: Oropharynx is clear  Eyes:      Extraocular Movements: Extraocular movements intact  Conjunctiva/sclera: Conjunctivae normal       Pupils: Pupils are equal, round, and reactive to light  Cardiovascular:      Rate and Rhythm: Normal rate and regular rhythm  Pulses: Normal pulses  Heart sounds: Normal heart sounds  Pulmonary:      Effort: Pulmonary effort is normal       Breath sounds: Normal breath sounds  Abdominal:      General: Abdomen is flat  Bowel sounds are normal    Musculoskeletal:         General: Normal range of motion  Cervical back: Normal range of motion and neck supple  Skin:     General: Skin is warm and dry  Neurological:      General: No focal deficit present  Mental Status: He is alert and oriented to person, place, and time     Psychiatric:         Mood and Affect: Mood normal           Amara Vinson MD  [facility]

## 2023-06-02 ENCOUNTER — CLINICAL SUPPORT (OUTPATIENT)
Dept: FAMILY MEDICINE CLINIC | Facility: CLINIC | Age: 43
End: 2023-06-02

## 2023-06-02 DIAGNOSIS — Z11.1 ENCOUNTER FOR PPD SKIN TEST READING: Primary | ICD-10-CM

## 2023-06-02 LAB
INDURATION: NEGATIVE MM
TB SKIN TEST: NEGATIVE

## 2023-06-16 LAB
ALBUMIN SERPL-MCNC: 4.7 G/DL (ref 3.6–5.1)
ALBUMIN/GLOB SERPL: 2 (CALC) (ref 1–2.5)
ALP SERPL-CCNC: 47 U/L (ref 36–130)
ALT SERPL-CCNC: 19 U/L (ref 9–46)
AST SERPL-CCNC: 21 U/L (ref 10–40)
BILIRUB SERPL-MCNC: 0.9 MG/DL (ref 0.2–1.2)
BUN SERPL-MCNC: 18 MG/DL (ref 7–25)
BUN/CREAT SERPL: NORMAL (CALC) (ref 6–22)
CALCIUM SERPL-MCNC: 9.8 MG/DL (ref 8.6–10.3)
CHLORIDE SERPL-SCNC: 101 MMOL/L (ref 98–110)
CHOLEST SERPL-MCNC: 209 MG/DL
CHOLEST/HDLC SERPL: 3 (CALC)
CO2 SERPL-SCNC: 27 MMOL/L (ref 20–32)
CREAT SERPL-MCNC: 0.94 MG/DL (ref 0.6–1.29)
ERYTHROCYTE [DISTWIDTH] IN BLOOD BY AUTOMATED COUNT: 11.9 % (ref 11–15)
GFR/BSA.PRED SERPLBLD CYS-BASED-ARV: 104 ML/MIN/1.73M2
GLOBULIN SER CALC-MCNC: 2.4 G/DL (CALC) (ref 1.9–3.7)
GLUCOSE SERPL-MCNC: 81 MG/DL (ref 65–99)
HBA1C MFR BLD: 5.1 % OF TOTAL HGB
HCT VFR BLD AUTO: 42.9 % (ref 38.5–50)
HDLC SERPL-MCNC: 69 MG/DL
HGB BLD-MCNC: 14.4 G/DL (ref 13.2–17.1)
LDLC SERPL CALC-MCNC: 126 MG/DL (CALC)
MCH RBC QN AUTO: 30.6 PG (ref 27–33)
MCHC RBC AUTO-ENTMCNC: 33.6 G/DL (ref 32–36)
MCV RBC AUTO: 91.1 FL (ref 80–100)
NONHDLC SERPL-MCNC: 140 MG/DL (CALC)
PLATELET # BLD AUTO: 239 THOUSAND/UL (ref 140–400)
PMV BLD REES-ECKER: 11.5 FL (ref 7.5–12.5)
POTASSIUM SERPL-SCNC: 4.6 MMOL/L (ref 3.5–5.3)
PROT SERPL-MCNC: 7.1 G/DL (ref 6.1–8.1)
RBC # BLD AUTO: 4.71 MILLION/UL (ref 4.2–5.8)
SODIUM SERPL-SCNC: 137 MMOL/L (ref 135–146)
TRIGL SERPL-MCNC: 45 MG/DL
TSH SERPL-ACNC: 1.81 MIU/L (ref 0.4–4.5)
WBC # BLD AUTO: 4.1 THOUSAND/UL (ref 3.8–10.8)

## 2023-07-06 ENCOUNTER — HOSPITAL ENCOUNTER (EMERGENCY)
Facility: HOSPITAL | Age: 43
Discharge: HOME | End: 2023-07-07
Attending: EMERGENCY MEDICINE
Payer: COMMERCIAL

## 2023-07-06 DIAGNOSIS — R07.9 CHEST PAIN: Primary | ICD-10-CM

## 2023-07-06 PROCEDURE — 99285 EMERGENCY DEPT VISIT HI MDM: CPT

## 2023-07-06 PROCEDURE — 93005 ELECTROCARDIOGRAM TRACING: CPT

## 2023-07-07 ENCOUNTER — APPOINTMENT (EMERGENCY)
Dept: RADIOLOGY | Facility: HOSPITAL | Age: 43
End: 2023-07-07
Payer: COMMERCIAL

## 2023-07-07 VITALS
SYSTOLIC BLOOD PRESSURE: 138 MMHG | DIASTOLIC BLOOD PRESSURE: 82 MMHG | BODY MASS INDEX: 27.2 KG/M2 | WEIGHT: 190 LBS | HEART RATE: 65 BPM | TEMPERATURE: 97.4 F | HEIGHT: 70 IN | RESPIRATION RATE: 20 BRPM | OXYGEN SATURATION: 100 %

## 2023-07-07 LAB
ALBUMIN SERPL BCP-MCNC: 3.8 G/DL (ref 3.5–5)
ALP SERPL-CCNC: 50 U/L (ref 46–116)
ALT SERPL W P-5'-P-CCNC: 38 U/L (ref 12–78)
ANION GAP SERPL CALCULATED.3IONS-SCNC: 3 MMOL/L
AST SERPL W P-5'-P-CCNC: 41 U/L (ref 5–45)
BASOPHILS # BLD AUTO: 0.06 THOUSANDS/ÂΜL (ref 0–0.1)
BASOPHILS NFR BLD AUTO: 1 % (ref 0–1)
BILIRUB SERPL-MCNC: 0.41 MG/DL (ref 0.2–1)
BUN SERPL-MCNC: 16 MG/DL (ref 5–25)
CALCIUM SERPL-MCNC: 9.1 MG/DL (ref 8.3–10.1)
CARDIAC TROPONIN I PNL SERPL HS: 6 NG/L
CHLORIDE SERPL-SCNC: 109 MMOL/L (ref 96–108)
CO2 SERPL-SCNC: 24 MMOL/L (ref 21–32)
CREAT SERPL-MCNC: 0.9 MG/DL (ref 0.6–1.3)
EOSINOPHIL # BLD AUTO: 0.13 THOUSAND/ÂΜL (ref 0–0.61)
EOSINOPHIL NFR BLD AUTO: 2 % (ref 0–6)
ERYTHROCYTE [DISTWIDTH] IN BLOOD BY AUTOMATED COUNT: 12.2 % (ref 11.6–15.1)
GFR SERPL CREATININE-BSD FRML MDRD: 104 ML/MIN/1.73SQ M
GLUCOSE SERPL-MCNC: 93 MG/DL (ref 65–140)
HCT VFR BLD AUTO: 42.3 % (ref 36.5–49.3)
HGB BLD-MCNC: 14.2 G/DL (ref 12–17)
IMM GRANULOCYTES # BLD AUTO: 0.01 THOUSAND/UL (ref 0–0.2)
IMM GRANULOCYTES NFR BLD AUTO: 0 % (ref 0–2)
LYMPHOCYTES # BLD AUTO: 2.67 THOUSANDS/ÂΜL (ref 0.6–4.47)
LYMPHOCYTES NFR BLD AUTO: 46 % (ref 14–44)
MCH RBC QN AUTO: 30.9 PG (ref 26.8–34.3)
MCHC RBC AUTO-ENTMCNC: 33.6 G/DL (ref 31.4–37.4)
MCV RBC AUTO: 92 FL (ref 82–98)
MONOCYTES # BLD AUTO: 0.51 THOUSAND/ÂΜL (ref 0.17–1.22)
MONOCYTES NFR BLD AUTO: 9 % (ref 4–12)
NEUTROPHILS # BLD AUTO: 2.47 THOUSANDS/ÂΜL (ref 1.85–7.62)
NEUTS SEG NFR BLD AUTO: 42 % (ref 43–75)
NRBC BLD AUTO-RTO: 0 /100 WBCS
PLATELET # BLD AUTO: 220 THOUSANDS/UL (ref 149–390)
PMV BLD AUTO: 12.3 FL (ref 8.9–12.7)
POTASSIUM SERPL-SCNC: 4.8 MMOL/L (ref 3.5–5.3)
PROT SERPL-MCNC: 7.2 G/DL (ref 6.4–8.4)
RBC # BLD AUTO: 4.59 MILLION/UL (ref 3.88–5.62)
SODIUM SERPL-SCNC: 136 MMOL/L (ref 135–147)
WBC # BLD AUTO: 5.85 THOUSAND/UL (ref 4.31–10.16)

## 2023-07-07 PROCEDURE — 36415 COLL VENOUS BLD VENIPUNCTURE: CPT

## 2023-07-07 PROCEDURE — 71046 X-RAY EXAM CHEST 2 VIEWS: CPT

## 2023-07-07 PROCEDURE — 80053 COMPREHEN METABOLIC PANEL: CPT

## 2023-07-07 PROCEDURE — 84484 ASSAY OF TROPONIN QUANT: CPT

## 2023-07-07 PROCEDURE — 85025 COMPLETE CBC W/AUTO DIFF WBC: CPT

## 2023-07-07 NOTE — DISCHARGE INSTRUCTIONS
Please make a follow-up appoint with your primary care doctor in 1 to 2 weeks. You may try antacids if your pain persists or recurs. Please return to the emerged department eval any new or concerning symptoms including worsening chest pain, difficulty breathing, or severe nausea and vomiting.

## 2023-07-07 NOTE — ED ATTENDING ATTESTATION
7/6/2023  I, Twyla Morin MD, saw and evaluated the patient. I have discussed the patient with the resident/non-physician practitioner and agree with the resident's/non-physician practitioner's findings, Plan of Care, and MDM as documented in the resident's/non-physician practitioner's note, except where noted. All available labs and Radiology studies were reviewed. I was present for key portions of any procedure(s) performed by the resident/non-physician practitioner and I was immediately available to provide assistance. At this point I agree with the current assessment done in the Emergency Department. I have conducted an independent evaluation of this patient a history and physical is as follows:    51-year-old male with no known cardiac risk factors presents the emergency department for evaluation of chest discomfort. Patient reports retrosternal chest pain that he describes as burning in nature that has been present for the past 24 hours. It is nonradiating without any worsening or alleviating factors. No pleuritic component. No fevers or chills. No cough or shortness of breath. Does endorse some associated nausea, but no vomiting. No diaphoresis. No leg pain or swelling. No prior history of DVT or PE. No recent surgeries. Physical Exam  Vitals and nursing note reviewed. Constitutional:       General: He is not in acute distress. HENT:      Head: Normocephalic and atraumatic. Right Ear: External ear normal.      Left Ear: External ear normal.      Nose: Nose normal.      Mouth/Throat:      Mouth: Mucous membranes are moist.   Eyes:      Extraocular Movements: Extraocular movements intact. Pupils: Pupils are equal, round, and reactive to light. Cardiovascular:      Rate and Rhythm: Normal rate and regular rhythm. Heart sounds: Normal heart sounds. No murmur heard. Pulmonary:      Effort: Pulmonary effort is normal. No respiratory distress.       Breath sounds: Normal breath sounds. No stridor. Chest:      Chest wall: No tenderness or crepitus. Abdominal:      General: Abdomen is flat. Bowel sounds are normal.      Tenderness: There is no abdominal tenderness. There is no guarding or rebound. Musculoskeletal:         General: No deformity. Normal range of motion. Cervical back: Normal range of motion and neck supple. Right lower leg: No edema. Left lower leg: No edema. Skin:     General: Skin is warm and dry. Capillary Refill: Capillary refill takes less than 2 seconds. Neurological:      General: No focal deficit present. Mental Status: He is alert and oriented to person, place, and time. Psychiatric:         Mood and Affect: Mood normal.         Behavior: Behavior normal.           ED Course  ED Course as of 07/07/23 0140   Fri Jul 07, 2023   0008 EKG interpreted by myself demonstrates normal sinus rhythm with a rate of 67, normal axis, normal intervals, normal ST segment and T waves   0114 hs TnI 0hr: 6   0114 No acute cardiopulmonary abnormality per my interpretation of chest x-ray     MDM:  Chest pain: Differential diagnosis includes but is not limited to ACS, arrhythmia, pericarditis, pneumothorax, anxiety, gastritis. We will check cardiac work-up and reassess. Patient is low risk according to Wells criteria for PE and is PERC negative. Labs grossly unremarkable. Heart score is 0. Plan to discharge home.     Critical Care Time  Procedures

## 2023-07-07 NOTE — ED PROVIDER NOTES
History  Chief Complaint   Patient presents with   • Chest Pain     Pt c/o 24hr os chest tightness with numbness/tingling to fingertips. Patient is a 41-year-old otherwise healthy male who presents with chest pain. Patient states that he was down in [place] for a  when he developed retrosternal chest tightness while lying in bed. He says that the pain was enough that it made it difficult for him to sleep. He states that the pain waxed and waned over the course the 24 hours. He took Tums and aspirin this afternoon which seemed to relieve some of the pain, but he says that it has persisted. He states that he has had some associated nausea and fingertip tingling, but denies any headache, vision changes, shortness of breath, cough, abdominal pain, or back pain. Patient states that he has had occasional chest tightness in the past but that it is never been this persistent. Patient states that the pain has been making him anxious especially because his father passed away from a heart attack at the age of 58. No other family numbers with history of early onset heart disease or early death. Per chart review, patient is a former smoker, however patient states that he occasionally smokes cigars. None       Past Medical History:   Diagnosis Date   • Colon polyp        Past Surgical History:   Procedure Laterality Date   • CIRCUMCISION     • COLONOSCOPY     • SHOULDER SURGERY Left    • WISDOM TOOTH EXTRACTION         Family History   Problem Relation Age of Onset   • Macular degeneration Mother    • Heart attack Father    • Clotting disorder Father    • Colon cancer Neg Hx      I have reviewed and agree with the history as documented.     E-Cigarette/Vaping   • E-Cigarette Use Former User    • Comments tried it, never a regular uses      E-Cigarette/Vaping Substances   • Nicotine No    • THC Yes    • CBD No    • Flavoring No    • Other No    • Unknown No      Social History     Tobacco Use • Smoking status: Never   • Smokeless tobacco: Never   Vaping Use   • Vaping Use: Former   • Substances: THC   Substance Use Topics   • Alcohol use: Yes     Comment: social; 2-4 beers once a week   • Drug use: Never     Comment: No use        Review of Systems   Constitutional: Negative for chills and fever. HENT: Negative for congestion, rhinorrhea and sore throat. Eyes: Negative for pain and redness. Respiratory: Positive for chest tightness. Negative for cough and shortness of breath. Cardiovascular: Negative for chest pain and palpitations. Gastrointestinal: Positive for nausea. Negative for abdominal pain, constipation, diarrhea and vomiting. Genitourinary: Negative for dysuria and hematuria. Musculoskeletal: Negative for back pain and neck pain. Skin: Negative for pallor and rash. Neurological: Negative for weakness and numbness. Psychiatric/Behavioral: The patient is nervous/anxious. All other systems reviewed and are negative. Physical Exam  ED Triage Vitals [07/06/23 2339]   Temperature Pulse Respirations Blood Pressure SpO2   (!) 97.4 °F (36.3 °C) 70 20 (!) 151/106 98 %      Temp Source Heart Rate Source Patient Position - Orthostatic VS BP Location FiO2 (%)   Temporal Monitor Lying Left arm --      Pain Score       4             Orthostatic Vital Signs  Vitals:    07/06/23 2339 07/07/23 0009   BP: (!) 151/106 138/82   Pulse: 70 65   Patient Position - Orthostatic VS: Lying Lying       Physical Exam  Vitals and nursing note reviewed. Constitutional:       General: He is not in acute distress. Appearance: Normal appearance. He is well-developed and normal weight. He is not ill-appearing, toxic-appearing or diaphoretic. HENT:      Head: Normocephalic and atraumatic. Right Ear: External ear normal.      Left Ear: External ear normal.      Nose: Nose normal. No congestion or rhinorrhea.       Mouth/Throat:      Mouth: Mucous membranes are moist.      Pharynx: Oropharynx is clear. No oropharyngeal exudate or posterior oropharyngeal erythema. Eyes:      General: No scleral icterus. Right eye: No discharge. Left eye: No discharge. Extraocular Movements: Extraocular movements intact. Conjunctiva/sclera: Conjunctivae normal.      Pupils: Pupils are equal, round, and reactive to light. Cardiovascular:      Rate and Rhythm: Normal rate and regular rhythm. Pulses: Normal pulses. Radial pulses are 2+ on the right side and 2+ on the left side. Heart sounds: Normal heart sounds. Heart sounds not distant. No murmur heard. No systolic murmur is present. No diastolic murmur is present. No friction rub. No gallop. No S3 or S4 sounds. Pulmonary:      Effort: Pulmonary effort is normal. No respiratory distress. Breath sounds: Normal breath sounds. No stridor. No decreased breath sounds, wheezing, rhonchi or rales. Abdominal:      General: Abdomen is flat. Bowel sounds are normal. There is no distension. Palpations: Abdomen is soft. Tenderness: There is no abdominal tenderness. There is no right CVA tenderness, left CVA tenderness or guarding. Musculoskeletal:         General: No tenderness. Cervical back: Normal range of motion and neck supple. No rigidity or tenderness. Right lower leg: No tenderness. No edema. Left lower leg: No tenderness. No edema. Skin:     General: Skin is warm and dry. Capillary Refill: Capillary refill takes less than 2 seconds. Coloration: Skin is not jaundiced or pale. Neurological:      General: No focal deficit present. Mental Status: He is alert and oriented to person, place, and time. Cranial Nerves: No cranial nerve deficit. Sensory: No sensory deficit. Motor: No weakness.    Psychiatric:         Mood and Affect: Mood normal.         Behavior: Behavior normal.         ED Medications  Medications - No data to display    Diagnostic Studies  Results Reviewed     Procedure Component Value Units Date/Time    Comprehensive metabolic panel [[id number]]  (Abnormal) Collected: 07/07/23 0020    Lab Status: Final result Specimen: Blood from Arm, Left Updated: 07/07/23 0118     Sodium 136 mmol/L      Potassium 4.8 mmol/L      Chloride 109 mmol/L      CO2 24 mmol/L      ANION GAP 3 mmol/L      BUN 16 mg/dL      Creatinine 0.90 mg/dL      Glucose 93 mg/dL      Calcium 9.1 mg/dL      AST 41 U/L      ALT 38 U/L      Alkaline Phosphatase 50 U/L      Total Protein 7.2 g/dL      Albumin 3.8 g/dL      Total Bilirubin 0.41 mg/dL      eGFR 104 ml/min/1.73sq m     Narrative:      National Kidney Disease Foundation guidelines for Chronic Kidney Disease (CKD):   •  Stage 1 with normal or high GFR (GFR > 90 mL/min/1.73 square meters)  •  Stage 2 Mild CKD (GFR = 60-89 mL/min/1.73 square meters)  •  Stage 3A Moderate CKD (GFR = 45-59 mL/min/1.73 square meters)  •  Stage 3B Moderate CKD (GFR = 30-44 mL/min/1.73 square meters)  •  Stage 4 Severe CKD (GFR = 15-29 mL/min/1.73 square meters)  •  Stage 5 End Stage CKD (GFR <15 mL/min/1.73 square meters)  Note: GFR calculation is accurate only with a steady state creatinine    HS Troponin 0hr (reflex protocol) [[id number]]  (Normal) Collected: 07/07/23 0020    Lab Status: Final result Specimen: Blood from Arm, Left Updated: 07/07/23 0113     hs TnI 0hr 6 ng/L     HS Troponin I 2hr [[id number]]     Lab Status: No result Specimen: Blood     CBC and differential [[id number]]  (Abnormal) Collected: 07/07/23 0020    Lab Status: Final result Specimen: Blood from Arm, Left Updated: 07/07/23 0054     WBC 5.85 Thousand/uL      RBC 4.59 Million/uL      Hemoglobin 14.2 g/dL      Hematocrit 42.3 %      MCV 92 fL      MCH 30.9 pg      MCHC 33.6 g/dL      RDW 12.2 %      MPV 12.3 fL      Platelets 557 Thousands/uL      nRBC 0 /100 WBCs      Neutrophils Relative 42 %      Immat GRANS % 0 %      Lymphocytes Relative 46 % Monocytes Relative 9 %      Eosinophils Relative 2 %      Basophils Relative 1 %      Neutrophils Absolute 2.47 Thousands/µL      Immature Grans Absolute 0.01 Thousand/uL      Lymphocytes Absolute 2.67 Thousands/µL      Monocytes Absolute 0.51 Thousand/µL      Eosinophils Absolute 0.13 Thousand/µL      Basophils Absolute 0.06 Thousands/µL                  XR chest 2 views   ED Interpretation by Talia Lewsi MD (07/07 0115)   No acute cardiopulmonary abnormalities. Procedures  Procedures      ED Course             HEART Risk Score    Flowsheet Row Most Recent Value   Heart Score Risk Calculator    History 0 Filed at: 07/07/2023 0120   ECG 0 Filed at: 07/07/2023 0120   Age 0 Filed at: 07/07/2023 0120   Risk Factors 0 Filed at: 07/07/2023 0120   Troponin 0 Filed at: 07/07/2023 0120   HEART Score 0 Filed at: 07/07/2023 0120                      SBIRT 20yo+    Flowsheet Row Most Recent Value   Initial Alcohol Screen: US AUDIT-C     1. How often do you have a drink containing alcohol? 0 Filed at: 07/06/2023 2342   2. How many drinks containing alcohol do you have on a typical day you are drinking? 0 Filed at: 07/06/2023 2342   3a. Male UNDER 65: How often do you have five or more drinks on one occasion? 0 Filed at: 07/06/2023 2342   Audit-C Score 0 Filed at: 07/06/2023 2342   [place]: How many times in the past year have you. .. Used an illegal drug or used a prescription medication for non-medical reasons? Never Filed at: 07/06/2023 2342                Medical Decision Making  Patient is a 51-year-old male who presents with chest pain. DDx includes but not limited to ACS, pneumothorax, pericarditis, anxiety, and GERD. Patient has no known risk factors. On initial EKG read, there are no acute ST or T wave changes, however there is some notching of the QRS waves and some likely benign early repole. Patient also with a history of occasional cigar smoking.   Given this will obtain chest x-ray, CBC, CMP, troponin. Patient defers any pain control at this time. Patient's labs reveal troponin of 6, given patient's symptoms have been ongoing for the past 24 hours, and he is low risk this does not represent a significant lab finding. His chest x-ray also shows no acute cardiopulmonary abnormalities. Discussed with patient that there are no emergent findings on his labs or imaging. At this time his symptoms are most likely secondary to GERD versus anxiety. Advised him to follow-up with his PCP in 1 to 2 weeks. Advised antacid use if his symptoms persist or recur. Return precautions given, all questions answered. Amount and/or Complexity of Data Reviewed  Labs: ordered. Radiology: ordered and independent interpretation performed. Disposition  Final diagnoses:   Chest pain     Time reflects when diagnosis was documented in both MDM as applicable and the Disposition within this note     Time User Action Codes Description Comment    7/7/2023  1:20 AM Laci Chaves Add [R07.9] Chest pain       ED Disposition     ED Disposition   Discharge    Condition   Stable    Date/Time   Fri Jul 7, 2023  1:20 AM    Comment   Bill Starr discharge to home/self care. Follow-up Information     Follow up With Specialties Details Why Contact Info Additional Information    Natalia Espinoza, DO Family Medicine Schedule an appointment as soon as possible for a visit   [place] [address] [facility] Emergency Department Emergency Medicine Go to  If symptoms worsen or if you have any other specific concerns [address] [address]  [facility] Emergency Department, [address] [place] [address], [id number] [phone]          There are no discharge medications for this patient. No discharge procedures on file.     PDMP Review     None           ED Provider  Attending physically available and evaluated Priscilla Golden. I managed the patient along with the ED Attending.     Electronically Signed by         Kyle Uribe MD  07/07/23 0164

## 2023-07-08 LAB
ATRIAL RATE: 67 BPM
P AXIS: 79 DEGREES
PR INTERVAL: 166 MS
QRS AXIS: 80 DEGREES
QRSD INTERVAL: 98 MS
QT INTERVAL: 376 MS
QTC INTERVAL: 397 MS
T WAVE AXIS: 54 DEGREES
VENTRICULAR RATE: 67 BPM

## 2023-07-08 PROCEDURE — 93010 ELECTROCARDIOGRAM REPORT: CPT | Performed by: INTERNAL MEDICINE

## 2023-07-18 ENCOUNTER — OFFICE VISIT (OUTPATIENT)
Dept: FAMILY MEDICINE CLINIC | Facility: CLINIC | Age: 43
End: 2023-07-18
Payer: COMMERCIAL

## 2023-07-18 VITALS
HEIGHT: 70 IN | HEART RATE: 69 BPM | RESPIRATION RATE: 15 BRPM | DIASTOLIC BLOOD PRESSURE: 84 MMHG | TEMPERATURE: 97.3 F | WEIGHT: 196.4 LBS | BODY MASS INDEX: 28.12 KG/M2 | OXYGEN SATURATION: 98 % | SYSTOLIC BLOOD PRESSURE: 120 MMHG

## 2023-07-18 DIAGNOSIS — Z09 FOLLOW-UP EXAM: ICD-10-CM

## 2023-07-18 DIAGNOSIS — R07.89 CHEST TIGHTNESS: ICD-10-CM

## 2023-07-18 DIAGNOSIS — M54.50 ACUTE RIGHT-SIDED LOW BACK PAIN WITHOUT SCIATICA: Primary | ICD-10-CM

## 2023-07-18 PROCEDURE — 99214 OFFICE O/P EST MOD 30 MIN: CPT | Performed by: STUDENT IN AN ORGANIZED HEALTH CARE EDUCATION/TRAINING PROGRAM

## 2023-07-18 RX ORDER — BACLOFEN 10 MG/1
5 TABLET ORAL 3 TIMES DAILY PRN
Qty: 8 TABLET | Refills: 0 | Status: SHIPPED | OUTPATIENT
Start: 2023-07-18 | End: 2023-07-23

## 2023-07-20 NOTE — PROGRESS NOTES
Name: Emre Bonilla      : 1980      MRN: [id number]  Encounter Provider: Cristal Galvez MD  Encounter Date: 2023   Encounter department: [address] [place] [address]     1. Acute right-sided low back pain without sciatica  -     baclofen 10 mg tablet; Take 0.5 tablets (5 mg total) by mouth 3 (three) times a day as needed for muscle spasms for up to 5 days    2. Follow-up exam    3. Chest tightness  -     Ambulatory Referral to Cardiology; Future    Labs reviewed with patient and wife who has accompanied him. All questions answered regarding labs. Lower right side back pain: Likely secondary to inflammation and movement from working outside. Discussed with patient proper bending and lifting techniques. Encourage patient to take NSAIDs as needed for pain and inflammation however on a full stomach to avoid GI ulceration or symptoms. We will also prescribe patient baclofen 5 mg up to 3 times daily as needed, discussed with patient to avoid heavy machinery use while taking muscle relaxer. Patient reports understanding. History of chest tightness: Currently asymptomatic however given patient's cardiac family history will refer to cardiology for further evaluation. Patient is strongly advised if symptoms occur to go straight to the ER       Subjective      Mr. Perla Muñiz is a 58-year-old male who presents to the office today for a follow-up of labs. Patient also reports yesterday he was working outside in the yard picking up wet lumbar and believes he twisted and turned awkwardly and has had some discomfort on the right lower back. Patient has used Aleve and ibuprofen with some relief. Denies any urinary symptoms. Patient also states he was recently in the ER for chest tightness after he was returning from a trip to the [place]. Patient reports there was discomfort without any radiation to his arm or jaw.   However patient was at a  therefore feels may have been some anxiety related to it also. However patient does state has happened in the past with improvement fairly quickly. In the ER exam and evaluation was normal.  Patient does have a family history of cardiac disease, denies any acute complaints or concerns related to chest discomfort at this time. Review of Systems   Constitutional: Negative for appetite change, chills and fever. HENT: Negative for congestion. Respiratory: Negative for cough, chest tightness and shortness of breath. Cardiovascular: Negative for chest pain and palpitations. Gastrointestinal: Negative for abdominal pain, diarrhea, nausea and vomiting. Musculoskeletal: Positive for back pain. Neurological: Negative for dizziness, light-headedness and headaches. No current outpatient medications on file prior to visit. Objective     /84   Pulse 69   Temp (!) 97.3 °F (36.3 °C) (Oral)   Resp 15   Ht 5' 10" (1.778 m)   Wt 89.1 kg (196 lb 6.4 oz)   SpO2 98%   BMI 28.18 kg/m²     Physical Exam  Vitals reviewed. Constitutional:       General: He is not in acute distress. Appearance: Normal appearance. He is not ill-appearing. HENT:      Head: Normocephalic and atraumatic. Mouth/Throat:      Mouth: Mucous membranes are moist.      Pharynx: Oropharynx is clear. Eyes:      Extraocular Movements: Extraocular movements intact. Pupils: Pupils are equal, round, and reactive to light. Cardiovascular:      Rate and Rhythm: Normal rate and regular rhythm. Pulses: Normal pulses. Heart sounds: Normal heart sounds. Pulmonary:      Effort: Pulmonary effort is normal.      Breath sounds: Normal breath sounds. Musculoskeletal:        Back:    Skin:     General: Skin is warm. Neurological:      Mental Status: He is alert.    Psychiatric:         Mood and Affect: Mood normal.       Rachel Kamara MD

## 2023-07-21 ENCOUNTER — APPOINTMENT (OUTPATIENT)
Dept: RADIOLOGY | Facility: CLINIC | Age: 43
End: 2023-07-21
Payer: COMMERCIAL

## 2023-07-21 DIAGNOSIS — M54.50 LOW BACK PAIN, UNSPECIFIED BACK PAIN LATERALITY, UNSPECIFIED CHRONICITY, UNSPECIFIED WHETHER SCIATICA PRESENT: ICD-10-CM

## 2023-07-21 PROCEDURE — 72110 X-RAY EXAM L-2 SPINE 4/>VWS: CPT

## 2023-08-07 ENCOUNTER — CONSULT (OUTPATIENT)
Dept: CARDIOLOGY CLINIC | Facility: CLINIC | Age: 43
End: 2023-08-07
Payer: COMMERCIAL

## 2023-08-07 VITALS
HEIGHT: 70 IN | HEART RATE: 70 BPM | OXYGEN SATURATION: 97 % | BODY MASS INDEX: 27.92 KG/M2 | SYSTOLIC BLOOD PRESSURE: 130 MMHG | WEIGHT: 195 LBS | DIASTOLIC BLOOD PRESSURE: 84 MMHG

## 2023-08-07 DIAGNOSIS — R00.2 HEART PALPITATIONS: ICD-10-CM

## 2023-08-07 DIAGNOSIS — R07.89 CHEST TIGHTNESS: Primary | ICD-10-CM

## 2023-08-07 PROCEDURE — 99204 OFFICE O/P NEW MOD 45 MIN: CPT | Performed by: INTERNAL MEDICINE

## 2023-08-07 NOTE — PROGRESS NOTES
Ammy Perez  1980  [id number]  [facility] [address]  [phone]  [phone]    1. Chest tightness  Ambulatory Referral to Cardiology    Echo complete w/ contrast if indicated    CT coronary calcium score      2. Heart palpitations  Echo complete w/ contrast if indicated    CT coronary calcium score          Discussion/Summary:    Chest pain recently that was atypical in nature. No further episodes of chest discomfort. He has a very good functional capacity. Mild palpitations recommend checking an echocardiogram to rule out any underlying structural heart disease. I have also recommended a coronary calcium score due to his family history of premature coronary disease. Blood pressure well controlled. Estimated 10-year cardiac risk based on lipid profile was 1%. Interval History: Very pleasant 45-year-old gentleman with family history of premature coronary disease presents for new consultation on behalf of Dr. Kevin Dow for chest discomfort. Recently he had an episode about a month ago. He had some tightness in the chest that lasted for 2 days. He was on vacation in was in some stressful situations. Functional capacity has been excellent the symptoms are not exertional related. He also has some palpitations and tingling in the fingers. Denies any radiation of the chest discomfort. No diaphoresis. Since that time. No recurrence he is quite physically active. Is been no lower extremity edema, PND, orthopnea. He has had no claudication.     Medical Problems     Problem List     C6 radiculopathy    Rectal bleeding    Personal history of colonic polyps    Internal hemorrhoids    PONV (postoperative nausea and vomiting)    De Quervain's tenosynovitis, right    Piriformis syndrome, right    Heart palpitations    Chest tightness        Past Medical History:   Diagnosis Date   • Colon polyp      Social History     Socioeconomic History   • Marital status: /Civil Union     Spouse name: Not on file   • Number of children: Not on file   • Years of education: Not on file   • Highest education level: Not on file   Occupational History   • Occupation:    Tobacco Use   • Smoking status: Never   • Smokeless tobacco: Never   Vaping Use   • Vaping Use: Former   • Substances: THC   Substance and Sexual Activity   • Alcohol use: Yes     Comment: social; 2-4 beers once a week   • Drug use: Never     Comment: No use   • Sexual activity: Yes   Other Topics Concern   • Not on file   Social History Narrative    Always uses seatbelts    Drinks coffee 2 cups per day     Social Determinants of Health     Financial Resource Strain: Not on file   Food Insecurity: Not on file   Transportation Needs: Not on file   Physical Activity: Not on file   Stress: Not on file   Social Connections: Not on file   Intimate Partner Violence: Not on file   Housing Stability: Not on file      Family History   Problem Relation Age of Onset   • Macular degeneration Mother    • Heart attack Father    • Clotting disorder Father    • Colon cancer Neg Hx      Past Surgical History:   Procedure Laterality Date   • CIRCUMCISION     • COLONOSCOPY     • SHOULDER SURGERY Left 1998   • WISDOM TOOTH EXTRACTION         Current Outpatient Medications:   •  baclofen 10 mg tablet, Take 0.5 tablets (5 mg total) by mouth 3 (three) times a day as needed for muscle spasms for up to 5 days, Disp: 8 tablet, Rfl: 0  No Known Allergies    Labs:     Chemistry        Component Value Date/Time    K 4.8 07/07/2023 0020    K 4.6 06/15/2023 0922     (H) 07/07/2023 0020     06/15/2023 0922    CO2 24 07/07/2023 0020    CO2 27 06/15/2023 0922    BUN 16 07/07/2023 0020    BUN 18 06/15/2023 0922    CREATININE 0.90 07/07/2023 0020        Component Value Date/Time    CALCIUM 9.1 07/07/2023 0020    CALCIUM 9.8 06/15/2023 0922    ALKPHOS 50 07/07/2023 0020    ALKPHOS 47 06/15/2023 0922    AST 41 07/07/2023 0020    AST 21 06/15/2023 0922    ALT 38 07/07/2023 0020    ALT 19 06/15/2023 0922            No results found for: "CHOL"  Lab Results   Component Value Date    HDL 69 06/15/2023     Lab Results   Component Value Date    LDLCALC 126 (H) 06/15/2023     Lab Results   Component Value Date    TRIG 45 06/15/2023     No results found for: "CHOLHDL"    Imaging: XR spine lumbar minimum 4 views non injury    Result Date: 8/1/2023  Narrative: LUMBAR SPINE INDICATION:   M54.50: Low back pain, unspecified. COMPARISON:  None VIEWS:  XR SPINE LUMBAR MINIMUM 4 VIEWS NON INJURY Images: 5 FINDINGS: There are 5 non rib bearing lumbar vertebral bodies. There is no evidence of acute fracture or destructive osseous lesion. Alignment is unremarkable. Intervertebral disc space narrowing at L5-S1. Small endplate osteophytes present at L3-L5. The pedicles appear intact. No instability and bilateral lean views. Soft tissues are unremarkable. Impression: Mild degenerative changes of the lower lumbar spine. Workstation performed: [id number]       ECG:     Review of Systems   Constitutional: Negative. HENT: Negative. Eyes: Negative. Cardiovascular: Positive for chest pain and palpitations. Respiratory: Negative. Endocrine: Negative. Hematologic/Lymphatic: Negative. Skin: Negative. Musculoskeletal: Negative. Gastrointestinal: Negative. Genitourinary: Negative. Neurological: Negative. Psychiatric/Behavioral: Negative. All other systems reviewed and are negative. Vitals:    08/07/23 1116   BP: 130/84   Pulse: 70   SpO2: 97%     Vitals:    08/07/23 1116   Weight: 88.5 kg (195 lb)     Height: 5' 10" (177.8 cm)   Body mass index is 27.98 kg/m². Physical Exam:  Vital signs reviewed.   General appearance:  Appears stated age, alert, well appearing and in no distress  HEENT:  PERRLA, EOMI, no scleral icterus, no conjunctival pallor  NECK:  Supple, No elevated JVP, no thyromegaly, no carotid bruits, no JVD  HEART:  Regular rate and rhythm, normal S1/S2, no S3/S4, no murmur or rub, PMI nondisplaced  LUNGS:  Clear to auscultation bilaterally, no wheezes rales or rhonchi  ABDOMEN:  Soft, non-tender, positive bowel sounds, no rebound or guarding, no organomegaly   EXTREMITIES:  Normal range of motion. No clubbing or cyanosis.  No edema  VASCULAR:  Normal pedal pulses   SKIN: No lesions or rashes on exposed skin  NEURO:  CN II-XII intact, no focal deficits

## 2023-08-14 ENCOUNTER — HOSPITAL ENCOUNTER (OUTPATIENT)
Dept: CT IMAGING | Facility: HOSPITAL | Age: 43
Discharge: HOME | End: 2023-08-14
Attending: INTERNAL MEDICINE
Payer: COMMERCIAL

## 2023-08-14 DIAGNOSIS — R00.2 HEART PALPITATIONS: ICD-10-CM

## 2023-08-14 DIAGNOSIS — R07.89 CHEST TIGHTNESS: ICD-10-CM

## 2023-08-14 PROCEDURE — 75571 CT HRT W/O DYE W/CA TEST: CPT

## 2023-08-14 PROCEDURE — G1004 CDSM NDSC: HCPCS

## 2023-08-22 DIAGNOSIS — R07.89 CHEST TIGHTNESS: Primary | ICD-10-CM

## 2023-08-22 DIAGNOSIS — I25.84 CORONARY ARTERY CALCIFICATION: ICD-10-CM

## 2023-08-22 DIAGNOSIS — I25.10 CORONARY ARTERY CALCIFICATION: ICD-10-CM

## 2023-08-22 RX ORDER — ROSUVASTATIN CALCIUM 5 MG/1
5 TABLET, COATED ORAL DAILY
Qty: 90 TABLET | Refills: 3 | Status: SHIPPED | OUTPATIENT
Start: 2023-08-22

## 2023-08-24 ENCOUNTER — DOCUMENTATION (OUTPATIENT)
Dept: CARDIOLOGY CLINIC | Facility: CLINIC | Age: 43
End: 2023-08-24

## 2023-09-10 LAB
ALBUMIN SERPL-MCNC: 4.6 G/DL (ref 3.6–5.1)
ALBUMIN/GLOB SERPL: 2.2 (CALC) (ref 1–2.5)
ALP SERPL-CCNC: 40 U/L (ref 36–130)
ALT SERPL-CCNC: 16 U/L (ref 9–46)
AST SERPL-CCNC: 15 U/L (ref 10–40)
BILIRUB SERPL-MCNC: 0.8 MG/DL (ref 0.2–1.2)
BUN SERPL-MCNC: 17 MG/DL (ref 7–25)
BUN/CREAT SERPL: NORMAL (CALC) (ref 6–22)
CALCIUM SERPL-MCNC: 9.6 MG/DL (ref 8.6–10.3)
CHLORIDE SERPL-SCNC: 106 MMOL/L (ref 98–110)
CO2 SERPL-SCNC: 29 MMOL/L (ref 20–32)
CREAT SERPL-MCNC: 0.97 MG/DL (ref 0.6–1.29)
GFR/BSA.PRED SERPLBLD CYS-BASED-ARV: 99 ML/MIN/1.73M2
GLOBULIN SER CALC-MCNC: 2.1 G/DL (CALC) (ref 1.9–3.7)
GLUCOSE SERPL-MCNC: 89 MG/DL (ref 65–99)
POTASSIUM SERPL-SCNC: 4.4 MMOL/L (ref 3.5–5.3)
PROT SERPL-MCNC: 6.7 G/DL (ref 6.1–8.1)
SODIUM SERPL-SCNC: 141 MMOL/L (ref 135–146)

## 2023-09-25 ENCOUNTER — HOSPITAL ENCOUNTER (OUTPATIENT)
Dept: NON INVASIVE DIAGNOSTICS | Facility: CLINIC | Age: 43
Discharge: HOME | End: 2023-09-25
Payer: COMMERCIAL

## 2023-09-25 VITALS
HEIGHT: 70 IN | HEART RATE: 70 BPM | DIASTOLIC BLOOD PRESSURE: 84 MMHG | WEIGHT: 195 LBS | SYSTOLIC BLOOD PRESSURE: 130 MMHG | BODY MASS INDEX: 27.92 KG/M2

## 2023-09-25 VITALS — DIASTOLIC BLOOD PRESSURE: 78 MMHG | OXYGEN SATURATION: 98 % | SYSTOLIC BLOOD PRESSURE: 118 MMHG | HEART RATE: 77 BPM

## 2023-09-25 DIAGNOSIS — R07.89 CHEST TIGHTNESS: ICD-10-CM

## 2023-09-25 DIAGNOSIS — I25.10 CORONARY ARTERY CALCIFICATION: ICD-10-CM

## 2023-09-25 DIAGNOSIS — R00.2 HEART PALPITATIONS: ICD-10-CM

## 2023-09-25 DIAGNOSIS — I25.84 CORONARY ARTERY CALCIFICATION: ICD-10-CM

## 2023-09-25 LAB
AORTIC ROOT: 2.8 CM
APICAL FOUR CHAMBER EJECTION FRACTION: 62 %
E WAVE DECELERATION TIME: 202 MS
FRACTIONAL SHORTENING: 29 % (ref 28–44)
INTERVENTRICULAR SEPTUM IN DIASTOLE (PARASTERNAL SHORT AXIS VIEW): 1 CM
INTERVENTRICULAR SEPTUM: 1 CM (ref 0.6–1.1)
LAAS-AP2: 13.4 CM2
LAAS-AP4: 16.8 CM2
LEFT ATRIUM AREA SYSTOLE SINGLE PLANE A4C: 15.1 CM2
LEFT ATRIUM SIZE: 3.1 CM
LEFT ATRIUM VOLUME (MOD BIPLANE): 35 ML
LEFT INTERNAL DIMENSION IN SYSTOLE: 2.9 CM (ref 2.1–4)
LEFT VENTRICULAR INTERNAL DIMENSION IN DIASTOLE: 4.1 CM (ref 3.5–6)
LEFT VENTRICULAR POSTERIOR WALL IN END DIASTOLE: 1 CM
LEFT VENTRICULAR STROKE VOLUME: 43 ML
LVSV (TEICH): 43 ML
MAX HR PERCENT: 103 %
MAX HR: 184 BPM
MV E'TISSUE VEL-SEP: 12 CM/S
MV PEAK A VEL: 0.45 M/S
MV PEAK E VEL: 59 CM/S
MV STENOSIS PRESSURE HALF TIME: 59 MS
MV VALVE AREA P 1/2 METHOD: 3.73 CM2
RATE PRESSURE PRODUCT: NORMAL
RIGHT ATRIUM AREA SYSTOLE A4C: 14.7 CM2
RIGHT VENTRICLE ID DIMENSION: 3.7 CM
SL CV LEFT ATRIUM LENGTH A2C: 5 CM
SL CV LV EF: 60
SL CV PED ECHO LEFT VENTRICLE DIASTOLIC VOLUME (MOD BIPLANE) 2D: 74 ML
SL CV PED ECHO LEFT VENTRICLE SYSTOLIC VOLUME (MOD BIPLANE) 2D: 31 ML
SL CV STRESS RECOVERY BP: NORMAL MMHG
SL CV STRESS RECOVERY HR: 114 BPM
SL CV STRESS RECOVERY O2 SAT: 98 %
STRESS ANGINA INDEX: 0
STRESS BASELINE BP: NORMAL MMHG
STRESS BASELINE HR: 77 BPM
STRESS O2 SAT REST: 98 %
STRESS PEAK HR: 184 BPM
STRESS POST ESTIMATED WORKLOAD: 17.2 METS
STRESS POST EXERCISE DUR MIN: 14 MIN
STRESS POST EXERCISE DUR SEC: 0 SEC
STRESS POST O2 SAT PEAK: 100 %
STRESS POST PEAK BP: 182 MMHG
TR MAX PG: 10 MMHG
TR PEAK VELOCITY: 1.6 M/S
TRICUSPID ANNULAR PLANE SYSTOLIC EXCURSION: 2.3 CM
TRICUSPID VALVE PEAK REGURGITATION VELOCITY: 1.62 M/S

## 2023-09-25 PROCEDURE — 93306 TTE W/DOPPLER COMPLETE: CPT | Performed by: INTERNAL MEDICINE

## 2023-09-25 PROCEDURE — 93018 CV STRESS TEST I&R ONLY: CPT | Performed by: INTERNAL MEDICINE

## 2023-09-25 PROCEDURE — 93016 CV STRESS TEST SUPVJ ONLY: CPT | Performed by: INTERNAL MEDICINE

## 2023-09-25 PROCEDURE — 93306 TTE W/DOPPLER COMPLETE: CPT

## 2023-09-25 PROCEDURE — 93017 CV STRESS TEST TRACING ONLY: CPT

## 2023-09-26 LAB
CHEST PAIN STATEMENT: NORMAL
MAX DIASTOLIC BP: 70 MMHG
MAX HEART RATE: 184 BPM
MAX PREDICTED HEART RATE: 177 BPM
MAX. SYSTOLIC BP: 182 MMHG
PROTOCOL NAME: NORMAL
REASON FOR TERMINATION: NORMAL
TARGET HR FORMULA: NORMAL
TEST INDICATION: NORMAL
TIME IN EXERCISE PHASE: NORMAL

## 2024-05-09 ENCOUNTER — OFFICE VISIT (OUTPATIENT)
Dept: CARDIOLOGY CLINIC | Facility: CLINIC | Age: 44
End: 2024-05-09
Payer: COMMERCIAL

## 2024-05-09 VITALS
WEIGHT: 199 LBS | BODY MASS INDEX: 27.86 KG/M2 | HEIGHT: 71 IN | SYSTOLIC BLOOD PRESSURE: 114 MMHG | HEART RATE: 73 BPM | OXYGEN SATURATION: 97 % | DIASTOLIC BLOOD PRESSURE: 78 MMHG

## 2024-05-09 DIAGNOSIS — R00.2 HEART PALPITATIONS: ICD-10-CM

## 2024-05-09 DIAGNOSIS — R93.1 ELEVATED CORONARY ARTERY CALCIUM SCORE: ICD-10-CM

## 2024-05-09 DIAGNOSIS — R07.89 CHEST TIGHTNESS: Primary | ICD-10-CM

## 2024-05-09 PROCEDURE — 99214 OFFICE O/P EST MOD 30 MIN: CPT | Performed by: INTERNAL MEDICINE

## 2024-05-09 NOTE — PROGRESS NOTES
Jae Nowak  1980  [id number]  [facility]  [address]  [facility] [id number] [phone]    1. Chest tightness  Lipid Panel with Direct LDL reflex    CBC and differential    Comprehensive metabolic panel    Lipid Panel with Direct LDL reflex    CBC and differential    Comprehensive metabolic panel      2. Heart palpitations  Lipid Panel with Direct LDL reflex    CBC and differential    Comprehensive metabolic panel    Lipid Panel with Direct LDL reflex    CBC and differential    Comprehensive metabolic panel      3. Elevated coronary artery calcium score  Lipid Panel with Direct LDL reflex    CBC and differential    Comprehensive metabolic panel    Lipid Panel with Direct LDL reflex    CBC and differential    Comprehensive metabolic panel          Discussion/Summary: Returns for follow-up visit.  Elevated coronary calcium score 230.  Stress test was within normal limits with an excellent functional capacity.  Echocardiogram showed no structural heart disease.  Continue with current dose of Crestor check blood work to evaluate efficacy of statin goal LDL less than 70.  He does have a history of some lower GI bleeding we will hold off on any aspirin at this time.  I will continue to follow him yearly.  Blood pressure well-controlled.  Continue diet exercise and lifestyle modifications.        Interval History: Very pleasant 43-year-old gentleman with family history of premature coronary disease presents for new consultation on behalf of Dr. Lorenz for chest discomfort.  Recently he had an episode about a month ago.  He had some tightness in the chest that lasted for 2 days.  He was on vacation in was in some stressful situations.  Functional capacity has been excellent the symptoms are not exertional related.  He also has some palpitations and tingling in the fingers.  Denies any radiation of the chest discomfort.  No diaphoresis.  Since that time.  No  recurrence he is quite physically active.  Is been no lower extremity edema, PND, orthopnea.  He has had no claudication.    Returns today for follow-up visit.  Overall he remains asymptomatic with a good functional capacity.  Denies any chest pain, shortness of breath, palpitations, lightheadedness, dizziness, or syncope.  There is been no lower extremity edema, PND, orthopnea.  Functional capacity is quite good recent treadmill and echo were reviewed.    Medical Problems       Problem List       C6 radiculopathy    Rectal bleeding    Personal history of colonic polyps    Internal hemorrhoids    PONV (postoperative nausea and vomiting)    De Quervain's tenosynovitis, right    Piriformis syndrome, right    Heart palpitations    Chest tightness        Past Medical History:   Diagnosis Date    Colon polyp      Social History     Socioeconomic History    Marital status: /Civil Union     Spouse name: Not on file    Number of children: Not on file    Years of education: Not on file    Highest education level: Not on file   Occupational History    Occupation:    Tobacco Use    Smoking status: Never    Smokeless tobacco: Never   Vaping Use    Vaping status: Former    Substances: THC   Substance and Sexual Activity    Alcohol use: Yes     Comment: social; 2-4 beers once a week    Drug use: Never     Comment: No use    Sexual activity: Yes   Other Topics Concern    Not on file   Social History Narrative    Always uses seatbelts    Drinks coffee 2 cups per day     Social Determinants of Health     Financial Resource Strain: Not on file   Food Insecurity: Not on file   Transportation Needs: Not on file   Physical Activity: Not on file   Stress: Not on file   Social Connections: Not on file   Intimate Partner Violence: Not on file   Housing Stability: Not on file      Family History   Problem Relation Age of Onset    Macular degeneration Mother     Heart attack Father     Clotting disorder Father     Colon  "cancer Neg Hx      Past Surgical History:   Procedure Laterality Date    CIRCUMCISION      COLONOSCOPY      SHOULDER SURGERY Left 1998    WISDOM TOOTH EXTRACTION         Current Outpatient Medications:     rosuvastatin (CRESTOR) 5 mg tablet, Take 1 tablet (5 mg total) by mouth daily, Disp: 90 tablet, Rfl: 3    baclofen 10 mg tablet, Take 0.5 tablets (5 mg total) by mouth 3 (three) times a day as needed for muscle spasms for up to 5 days, Disp: 8 tablet, Rfl: 0  No Known Allergies    Labs:     Chemistry        Component Value Date/Time    K 4.4 09/09/2023 1026     09/09/2023 1026    CO2 29 09/09/2023 1026    BUN 17 09/09/2023 1026    CREATININE 0.97 09/09/2023 1026    CREATININE 0.90 07/07/2023 0020        Component Value Date/Time    CALCIUM 9.6 09/09/2023 1026    ALKPHOS 40 09/09/2023 1026    AST 15 09/09/2023 1026    ALT 16 09/09/2023 1026            No results found for: \"CHOL\"  Lab Results   Component Value Date    HDL 69 06/15/2023     Lab Results   Component Value Date    LDLCALC 126 (H) 06/15/2023     Lab Results   Component Value Date    TRIG 45 06/15/2023     No results found for: \"CHOLHDL\"    Imaging: XR spine lumbar minimum 4 views non injury    Result Date: 8/1/2023  Narrative: LUMBAR SPINE INDICATION:   M54.50: Low back pain, unspecified. COMPARISON:  None VIEWS:  XR SPINE LUMBAR MINIMUM 4 VIEWS NON INJURY Images: 5 FINDINGS: There are 5 non rib bearing lumbar vertebral bodies. There is no evidence of acute fracture or destructive osseous lesion. Alignment is unremarkable. Intervertebral disc space narrowing at L5-S1. Small endplate osteophytes present at L3-L5. The pedicles appear intact. No instability and bilateral lean views. Soft tissues are unremarkable.     Impression: Mild degenerative changes of the lower lumbar spine. Workstation performed: [id number]       ECG:     Review of Systems   Constitutional: Negative.   HENT: Negative.     Eyes: Negative.    Cardiovascular:  Positive for chest " "pain and palpitations.   Respiratory: Negative.     Endocrine: Negative.    Hematologic/Lymphatic: Negative.    Skin: Negative.    Musculoskeletal: Negative.    Gastrointestinal: Negative.    Genitourinary: Negative.    Neurological: Negative.    Psychiatric/Behavioral: Negative.     All other systems reviewed and are negative.      Vitals:    05/09/24 1614   BP: 114/78   Pulse: 73   SpO2: 97%     Vitals:    05/09/24 1614   Weight: 90.3 kg (199 lb)     Height: 5' 10.5\" (179.1 cm)   Body mass index is 28.15 kg/m².    Physical Exam:  Vital signs reviewed  General:  Alert and cooperative, appears stated age, no acute distress  HEENT:  PERRLA, EOMI, no scleral icterus, no conjunctival pallor  Neck:  No lymphadenopathy, no thyromegaly, no carotid bruits, no elevated JVP  Heart:  Regular rate and rhythm, normal S1/S2, no S3/S4, no murmur, rubs or gallops.  PMI nondisplaced  Lungs:  Clear to auscultation bilaterally, no wheezes rales or rhonchi  Abdomen:  Soft, non-tender, positive bowel sounds, no rebound or guarding,   no organomegaly   Extremities:  Normal range of motion.  No clubbing, cyanosis or edema   Vascular:  2+ pedal pulses  Skin:  No rashes or lesions on exposed skin  Neurologic:  Cranial nerves II-XII grossly intact without focal deficits  Psych:  Normal mood and affect      "

## 2024-07-26 ENCOUNTER — OFFICE VISIT (OUTPATIENT)
Dept: FAMILY MEDICINE CLINIC | Facility: CLINIC | Age: 44
End: 2024-07-26
Payer: COMMERCIAL

## 2024-07-26 VITALS
SYSTOLIC BLOOD PRESSURE: 124 MMHG | BODY MASS INDEX: 28.88 KG/M2 | OXYGEN SATURATION: 99 % | DIASTOLIC BLOOD PRESSURE: 84 MMHG | RESPIRATION RATE: 16 BRPM | HEIGHT: 70 IN | WEIGHT: 201.7 LBS | HEART RATE: 70 BPM | TEMPERATURE: 98 F

## 2024-07-26 DIAGNOSIS — Z00.00 ANNUAL PHYSICAL EXAM: Primary | ICD-10-CM

## 2024-07-26 DIAGNOSIS — L98.9 FACIAL SKIN LESION: ICD-10-CM

## 2024-07-26 DIAGNOSIS — E78.2 MIXED HYPERLIPIDEMIA: ICD-10-CM

## 2024-07-26 PROCEDURE — 99396 PREV VISIT EST AGE 40-64: CPT | Performed by: PHYSICIAN ASSISTANT

## 2024-07-26 NOTE — PROGRESS NOTES
Adult Annual Physical  Name: Jae Nowak      : 1980      MRN: [id number]  Encounter Provider: Molly Webb PA-C  Encounter Date: 2024   Encounter department: [facility]    Assessment & Plan   1. Annual physical exam    Immunizations and preventive care screenings were discussed with patient today. Appropriate education was printed on patient's after visit summary.    Discussed risks and benefits of prostate cancer screening. We discussed the controversial history of PSA screening for prostate cancer in the [place] as well as the risk of over detection and over treatment of prostate cancer by way of PSA screening.  The patient understands that PSA blood testing is an imperfect way to screen for prostate cancer and that elevated PSA levels in the blood may also be caused by infection, inflammation, prostatic trauma or manipulation, urological procedures, or by benign prostatic enlargement.    The role of the digital rectal examination in prostate cancer screening was also discussed and I discussed with him that there is large interobserver variability in the findings of digital rectal examination.    Counseling:  Alcohol/drug use: discussed moderation in alcohol intake, the recommendations for healthy alcohol use, and avoidance of illicit drug use.  Dental Health: discussed importance of regular tooth brushing, flossing, and dental visits.  Injury prevention: discussed safety/seat belts, safety helmets, smoke detectors, carbon dioxide detectors, and smoking near bedding or upholstery.  Exercise: the importance of regular exercise/physical activity was discussed. Recommend exercise 3-5 times per week for at least 30 minutes.   Will reach out to Cardiology regarding starting GLP for heart due to calcium score and family history  Labs ordered  Colonoscopy due          History of Present Illness     Adult Annual Physical:  Patient presents for  "annual physical. Interseted in GLP.    Recurring skin lesion on left side of face, can pick off and then returns..     Diet and Physical Activity:  - Diet/Nutrition: well balanced diet.  - Exercise: 3-4 times a week on average and walking.    Depression Screening:  - PHQ-2 Score: 0    General Health:  - Sleep: sleeps well and 7-8 hours of sleep on average.  - Hearing: normal hearing bilateral ears.  - Vision: no vision problems and goes for regular eye exams.  - Dental: brushes teeth twice daily and regular dental visits.     Health:    - Urinary symptoms: none.     Review of Systems   Constitutional: Negative.    HENT: Negative.     Eyes: Negative.    Respiratory: Negative.     Cardiovascular: Negative.    Gastrointestinal: Negative.    Endocrine: Negative.    Genitourinary: Negative.    Musculoskeletal: Negative.    Allergic/Immunologic: Negative.    Neurological: Negative.    Hematological: Negative.    Psychiatric/Behavioral: Negative.       Medical History Reviewed by provider this encounter:  Problems         Objective     /84   Pulse 70   Temp 98 °F (36.7 °C)   Resp 16   Ht 5' 10\" (1.778 m)   Wt 91.5 kg (201 lb 11.2 oz)   SpO2 99%   BMI 28.94 kg/m²     Physical Exam  Constitutional:       Appearance: Normal appearance. He is well-developed and normal weight.   HENT:      Head: Normocephalic and atraumatic.      Right Ear: External ear normal.      Left Ear: External ear normal.   Eyes:      Extraocular Movements: Extraocular movements intact.      Conjunctiva/sclera: Conjunctivae normal.      Pupils: Pupils are equal, round, and reactive to light.   Neck:      Thyroid: No thyromegaly.   Cardiovascular:      Rate and Rhythm: Normal rate and regular rhythm.      Pulses: Normal pulses.      Heart sounds: Normal heart sounds. No murmur heard.  Pulmonary:      Effort: Pulmonary effort is normal.      Breath sounds: Normal breath sounds. No wheezing or rales.   Abdominal:      General: Abdomen is " flat. Bowel sounds are normal.      Palpations: Abdomen is soft. There is no mass.      Tenderness: There is no abdominal tenderness. There is no rebound.   Musculoskeletal:         General: Normal range of motion.      Cervical back: Normal range of motion and neck supple.   Lymphadenopathy:      Cervical: No cervical adenopathy.   Skin:     General: Skin is warm.   Neurological:      General: No focal deficit present.      Mental Status: He is alert and oriented to person, place, and time.      Cranial Nerves: No cranial nerve deficit.      Deep Tendon Reflexes: Reflexes normal.   Psychiatric:         Mood and Affect: Mood normal.         Behavior: Behavior normal.         Thought Content: Thought content normal.         Judgment: Judgment normal.

## 2024-08-15 LAB
ALBUMIN SERPL-MCNC: 4.8 G/DL (ref 3.6–5.1)
ALBUMIN/GLOB SERPL: 2 (CALC) (ref 1–2.5)
ALP SERPL-CCNC: 46 U/L (ref 36–130)
ALT SERPL-CCNC: 18 U/L (ref 9–46)
APPEARANCE UR: CLEAR
AST SERPL-CCNC: 22 U/L (ref 10–40)
BASOPHILS # BLD AUTO: 41 CELLS/UL (ref 0–200)
BASOPHILS NFR BLD AUTO: 0.9 %
BILIRUB SERPL-MCNC: 0.5 MG/DL (ref 0.2–1.2)
BILIRUB UR QL STRIP: NEGATIVE
BUN SERPL-MCNC: 14 MG/DL (ref 7–25)
BUN/CREAT SERPL: NORMAL (CALC) (ref 6–22)
CALCIUM SERPL-MCNC: 9.8 MG/DL (ref 8.6–10.3)
CHLORIDE SERPL-SCNC: 103 MMOL/L (ref 98–110)
CHOLEST SERPL-MCNC: 177 MG/DL
CHOLEST/HDLC SERPL: 2.9 (CALC)
CO2 SERPL-SCNC: 27 MMOL/L (ref 20–32)
COLOR UR: YELLOW
CREAT SERPL-MCNC: 0.91 MG/DL (ref 0.6–1.29)
EOSINOPHIL # BLD AUTO: 87 CELLS/UL (ref 15–500)
EOSINOPHIL NFR BLD AUTO: 1.9 %
ERYTHROCYTE [DISTWIDTH] IN BLOOD BY AUTOMATED COUNT: 11.9 % (ref 11–15)
GFR/BSA.PRED SERPLBLD CYS-BASED-ARV: 107 ML/MIN/1.73M2
GLOBULIN SER CALC-MCNC: 2.4 G/DL (CALC) (ref 1.9–3.7)
GLUCOSE SERPL-MCNC: 81 MG/DL (ref 65–99)
GLUCOSE UR QL STRIP: NEGATIVE
HCT VFR BLD AUTO: 42.3 % (ref 38.5–50)
HDLC SERPL-MCNC: 62 MG/DL
HGB BLD-MCNC: 14.4 G/DL (ref 13.2–17.1)
HGB UR QL STRIP: NEGATIVE
KETONES UR QL STRIP: ABNORMAL
LDLC SERPL CALC-MCNC: 102 MG/DL (CALC)
LEUKOCYTE ESTERASE UR QL STRIP: NEGATIVE
LYMPHOCYTES # BLD AUTO: 1711 CELLS/UL (ref 850–3900)
LYMPHOCYTES NFR BLD AUTO: 37.2 %
MCH RBC QN AUTO: 31 PG (ref 27–33)
MCHC RBC AUTO-ENTMCNC: 34 G/DL (ref 32–36)
MCV RBC AUTO: 91.2 FL (ref 80–100)
MONOCYTES # BLD AUTO: 460 CELLS/UL (ref 200–950)
MONOCYTES NFR BLD AUTO: 10 %
NEUTROPHILS # BLD AUTO: 2300 CELLS/UL (ref 1500–7800)
NEUTROPHILS NFR BLD AUTO: 50 %
NITRITE UR QL STRIP: NEGATIVE
NONHDLC SERPL-MCNC: 115 MG/DL (CALC)
PH UR STRIP: 6 [PH] (ref 5–8)
PLATELET # BLD AUTO: 228 THOUSAND/UL (ref 140–400)
PMV BLD REES-ECKER: 11.8 FL (ref 7.5–12.5)
POTASSIUM SERPL-SCNC: 4.5 MMOL/L (ref 3.5–5.3)
PROT SERPL-MCNC: 7.2 G/DL (ref 6.1–8.1)
PROT UR QL STRIP: NEGATIVE
RBC # BLD AUTO: 4.64 MILLION/UL (ref 4.2–5.8)
SODIUM SERPL-SCNC: 139 MMOL/L (ref 135–146)
SP GR UR STRIP: 1.01 (ref 1–1.03)
TRIGL SERPL-MCNC: 50 MG/DL
TSH SERPL-ACNC: 1.88 MIU/L (ref 0.4–4.5)
WBC # BLD AUTO: 4.6 THOUSAND/UL (ref 3.8–10.8)

## 2024-08-27 DIAGNOSIS — I25.84 CORONARY ARTERY CALCIFICATION: ICD-10-CM

## 2024-08-27 DIAGNOSIS — I25.10 CORONARY ARTERY CALCIFICATION: ICD-10-CM

## 2024-08-27 DIAGNOSIS — R07.89 CHEST TIGHTNESS: ICD-10-CM

## 2024-08-28 RX ORDER — ROSUVASTATIN CALCIUM 5 MG/1
5 TABLET, COATED ORAL DAILY
Qty: 90 TABLET | Refills: 1 | Status: SHIPPED | OUTPATIENT
Start: 2024-08-28

## 2025-02-21 DIAGNOSIS — I25.10 CORONARY ARTERY CALCIFICATION: ICD-10-CM

## 2025-02-21 DIAGNOSIS — R07.89 CHEST TIGHTNESS: ICD-10-CM

## 2025-02-21 RX ORDER — ROSUVASTATIN CALCIUM 5 MG/1
5 TABLET, COATED ORAL DAILY
Qty: 90 TABLET | Refills: 0 | Status: SHIPPED | OUTPATIENT
Start: 2025-02-21

## 2025-02-21 NOTE — TELEPHONE ENCOUNTER
Patient needs an appointment. Please contact the patient to schedule an appointment. Last office visit: 05/09/24

## 2025-03-06 ENCOUNTER — OFFICE VISIT (OUTPATIENT)
Dept: URGENT CARE | Facility: CLINIC | Age: 45
End: 2025-03-06
Payer: COMMERCIAL

## 2025-03-06 VITALS
DIASTOLIC BLOOD PRESSURE: 66 MMHG | HEIGHT: 70 IN | WEIGHT: 190 LBS | OXYGEN SATURATION: 99 % | RESPIRATION RATE: 16 BRPM | HEART RATE: 80 BPM | SYSTOLIC BLOOD PRESSURE: 124 MMHG | TEMPERATURE: 97.8 F | BODY MASS INDEX: 27.2 KG/M2

## 2025-03-06 DIAGNOSIS — J98.8 RESPIRATORY INFECTION: Primary | ICD-10-CM

## 2025-03-06 PROCEDURE — G0382 LEV 3 HOSP TYPE B ED VISIT: HCPCS

## 2025-03-06 RX ORDER — BENZONATATE 100 MG/1
100 CAPSULE ORAL 3 TIMES DAILY PRN
Qty: 20 CAPSULE | Refills: 0 | Status: SHIPPED | OUTPATIENT
Start: 2025-03-06

## 2025-03-06 NOTE — PROGRESS NOTES
[facility]        NAME: Jae Nowak is a 44 y.o. male  : 1980    MRN: [id number]  DATE: 2025  TIME: 5:57 PM    Assessment and Plan   Respiratory infection [J98.8]  1. Respiratory infection  benzonatate (TESSALON PERLES) 100 mg capsule        Patient presents with symptoms and examination consistent with a viral respiratory infection. Provided with a prescription for tessalon to help the symptoms of dry cough. Explained to patient the length of viral illnesses can be 7-14 days.Also explained to patient he is to many days into viral illness for covid/flu testing.  Explained symptoms of a bacterial infection and signs and symptoms to watch for.         Patient Instructions   Most upper respiratory infections are viral and resolve on their own within 10-14 days. Antibiotics are not indicated for the viral infection, and are only prescribed if there is evidence for a bacterial infection. Sometimes an upper respiratory infection may lead to secondary bacterial infection, such as bacterial sinusitis, in which case antibiotics would be indicated at that time. If your symptoms continue beyond 10-14 days or if you experience ongoing fevers, productive cough with green, brown, bloody phlegm production, you may have developed a bacterial infection.     Take tessalon as prescribed to help with cough symptoms     Follow up with PCP in 3-5 days.  Proceed to  ER if symptoms worsen.    If tests have been performed at [facility], our office will contact you with results if changes need to be made to the care plan discussed with you at the visit.  [facility]    Chief Complaint     Chief Complaint   Patient presents with    Cough     Started about a week ago with nasal congestion, sore throat, denies fever, and he's been taking Tylenol for relief        History of Present Illness     Pt reports sore throat that started 1 week ago and turned into a cough and  congestion. The sore throat has now resolved . He reports continued dry cough and nasal congestion. He denies sinus pain/pressure.  Pt denies fevers/nausea/vomiting/sob/cp    Cough  The current episode started in the past 7 days. The problem has been unchanged. Associated symptoms include rhinorrhea. Pertinent negatives include no chest pain, ear congestion, ear pain, fever, headaches, heartburn, myalgias, nasal congestion, postnasal drip, sore throat, shortness of breath or wheezing. He has tried OTC cough suppressant and rest for the symptoms. The treatment provided mild relief. There is no history of asthma.       Review of Systems   Review of Systems   Constitutional:  Negative for fever.   HENT:  Positive for rhinorrhea. Negative for ear pain, postnasal drip, sneezing and sore throat.    Eyes:  Negative for pain and discharge.   Respiratory:  Positive for cough (dry frequent cough). Negative for chest tightness, shortness of breath and wheezing.    Cardiovascular:  Negative for chest pain.   Gastrointestinal:  Negative for abdominal pain, heartburn, nausea and vomiting.   Musculoskeletal:  Negative for arthralgias, myalgias and neck pain.   Neurological:  Negative for headaches.         Current Medications       Current Outpatient Medications:     benzonatate (TESSALON PERLES) 100 mg capsule, Take 1 capsule (100 mg total) by mouth 3 (three) times a day as needed for cough, Disp: 20 capsule, Rfl: 0    co-enzyme Q-10 30 mg, Take 30 mg by mouth daily, Disp: , Rfl:     Omega-3 Fatty Acids (Fish Oil) 300 MG CAPS, Take by mouth, Disp: , Rfl:     rosuvastatin (CRESTOR) 5 mg tablet, TAKE 1 TABLET (5 MG TOTAL) BY MOUTH DAILY., Disp: 90 tablet, Rfl: 0    Current Allergies     Allergies as of 03/06/2025    (No Known Allergies)            The following portions of the patient's history were reviewed and updated as appropriate: allergies, current medications, past family history, past medical history, past social history,  "past surgical history and problem list.     Past Medical History:   Diagnosis Date    Colon polyp        Past Surgical History:   Procedure Laterality Date    CIRCUMCISION      COLONOSCOPY      SHOULDER SURGERY Left 1998    WISDOM TOOTH EXTRACTION         Family History   Problem Relation Age of Onset    Macular degeneration Mother     Heart attack Father     Clotting disorder Father     Heart disease Father     Diabetes Maternal Grandmother     Colon cancer Neg Hx          Medications have been verified.        Objective   /66 (BP Location: Left arm, Patient Position: Sitting, Cuff Size: Large)   Pulse 80   Temp 97.8 °F (36.6 °C) (Tympanic)   Resp 16   Ht 5' 10\" (1.778 m)   Wt 86.2 kg (190 lb)   SpO2 99%   BMI 27.26 kg/m²   No LMP for male patient.       Physical Exam     Physical Exam  Constitutional:       General: He is not in acute distress.     Appearance: Normal appearance. He is normal weight.   HENT:      Head: Normocephalic and atraumatic.      Right Ear: Tympanic membrane, ear canal and external ear normal.      Left Ear: Tympanic membrane, ear canal and external ear normal.      Nose: Congestion and rhinorrhea present.      Mouth/Throat:      Mouth: Mucous membranes are moist.      Pharynx: No oropharyngeal exudate or posterior oropharyngeal erythema.   Eyes:      Extraocular Movements: Extraocular movements intact.      Conjunctiva/sclera: Conjunctivae normal.      Pupils: Pupils are equal, round, and reactive to light.   Cardiovascular:      Rate and Rhythm: Normal rate.   Pulmonary:      Effort: Pulmonary effort is normal. No respiratory distress.      Breath sounds: Normal breath sounds. No stridor. No wheezing or rhonchi.      Comments: Frequent dry cough   Abdominal:      General: Abdomen is flat.   Musculoskeletal:         General: Normal range of motion.      Cervical back: Normal range of motion and neck supple.   Skin:     General: Skin is warm and dry.   Neurological:      " General: No focal deficit present.      Mental Status: He is alert and oriented to person, place, and time. Mental status is at baseline.   Psychiatric:         Mood and Affect: Mood normal.         Behavior: Behavior normal.         Thought Content: Thought content normal.

## 2025-03-06 NOTE — PATIENT INSTRUCTIONS
Most upper respiratory infections are viral and resolve on their own within 10-14 days. Antibiotics are not indicated for the viral infection, and are only prescribed if there is evidence for a bacterial infection. Sometimes an upper respiratory infection may lead to secondary bacterial infection, such as bacterial sinusitis, in which case antibiotics would be indicated at that time. If your symptoms continue beyond 10-14 days or if you experience ongoing fevers, productive cough with green, brown, bloody phlegm production, you may have developed a bacterial infection.     Take tessalon as prescribed to help with cough symptoms

## 2025-03-06 NOTE — PROGRESS NOTES
[facility]        NAME: Jae Nowak is a 44 y.o. male  : 1980    MRN: [id number]  DATE: 2025  TIME: 4:45 PM    Assessment and Plan   Respiratory infection [J98.8]  1. Respiratory infection  benzonatate (TESSALON PERLES) 100 mg capsule        Patient presents with symptoms and examination consistent with a viral respiratory infection. Provided with a prescription for tessalon to help the symptoms of dry cough. Explained to patient the length of viral illnesses can be 7-14 days.Also explained to patient he is to many days into viral illness for covid/flu testing.  Explained symptoms of a bacterial infection and signs and symptoms to watch for.     History of Present Illness       Patient Instructions       Follow up with PCP in 3-5 days.  Proceed to  ER if symptoms worsen.    If tests have been performed at [facility], our office will contact you with results if changes need to be made to the care plan discussed with you at the visit.  You can review your full results on [facility].    Chief Complaint     Chief Complaint   Patient presents with    Cough     Started about a week ago with nasal congestion, sore throat, denies fever, and he's been taking Tylenol for relief        History of Present Illness     Pt reports sore throat that started 1 week ago and turned into a cough and congestion. The sore throat has now resolved . He reports continued dry cough and nasal congestion. He denies sinus pain/pressure.  Pt denies fevers/nausea/vomiting/sob/cp    Cough  The current episode started in the past 7 days. The problem has been unchanged. Associated symptoms include rhinorrhea. Pertinent negatives include no chest pain, ear congestion, ear pain, fever, headaches, heartburn, myalgias, nasal congestion, postnasal drip, sore throat, shortness of breath or wheezing. He has tried OTC cough suppressant and rest for the symptoms. The treatment provided mild relief. There is no history of  asthma.       Review of Systems   Review of Systems   Constitutional:  Negative for fever.   HENT:  Positive for rhinorrhea. Negative for ear pain, postnasal drip, sneezing and sore throat.    Eyes:  Negative for pain and discharge.   Respiratory:  Positive for cough (dry frequent cough). Negative for chest tightness, shortness of breath and wheezing.    Cardiovascular:  Negative for chest pain.   Gastrointestinal:  Negative for abdominal pain, heartburn, nausea and vomiting.   Musculoskeletal:  Negative for arthralgias, myalgias and neck pain.   Neurological:  Negative for headaches.         Current Medications       Current Outpatient Medications:     benzonatate (TESSALON PERLES) 100 mg capsule, Take 1 capsule (100 mg total) by mouth 3 (three) times a day as needed for cough, Disp: 20 capsule, Rfl: 0    co-enzyme Q-10 30 mg, Take 30 mg by mouth daily, Disp: , Rfl:     Omega-3 Fatty Acids (Fish Oil) 300 MG CAPS, Take by mouth, Disp: , Rfl:     rosuvastatin (CRESTOR) 5 mg tablet, TAKE 1 TABLET (5 MG TOTAL) BY MOUTH DAILY., Disp: 90 tablet, Rfl: 0    Current Allergies     Allergies as of 03/06/2025    (No Known Allergies)            The following portions of the patient's history were reviewed and updated as appropriate: allergies, current medications, past family history, past medical history, past social history, past surgical history and problem list.     Past Medical History:   Diagnosis Date    Colon polyp        Past Surgical History:   Procedure Laterality Date    CIRCUMCISION      COLONOSCOPY      SHOULDER SURGERY Left 1998    WISDOM TOOTH EXTRACTION         Family History   Problem Relation Age of Onset    Macular degeneration Mother     Heart attack Father     Clotting disorder Father     Heart disease Father     Diabetes Maternal Grandmother     Colon cancer Neg Hx          Medications have been verified.        Objective   /66 (BP Location: Left arm, Patient Position: Sitting, Cuff Size: Large)    "Pulse 80   Temp 97.8 °F (36.6 °C) (Tympanic)   Resp 16   Ht 5' 10\" (1.778 m)   Wt 86.2 kg (190 lb)   SpO2 99%   BMI 27.26 kg/m²   No LMP for male patient.       Physical Exam     Physical Exam  Constitutional:       General: He is not in acute distress.     Appearance: Normal appearance. He is normal weight.   HENT:      Head: Normocephalic and atraumatic.      Right Ear: Tympanic membrane, ear canal and external ear normal.      Left Ear: Tympanic membrane, ear canal and external ear normal.      Nose: Congestion and rhinorrhea present.      Mouth/Throat:      Mouth: Mucous membranes are moist.      Pharynx: No oropharyngeal exudate or posterior oropharyngeal erythema.   Eyes:      Extraocular Movements: Extraocular movements intact.      Conjunctiva/sclera: Conjunctivae normal.      Pupils: Pupils are equal, round, and reactive to light.   Cardiovascular:      Rate and Rhythm: Normal rate.   Pulmonary:      Effort: Pulmonary effort is normal. No respiratory distress.      Breath sounds: Normal breath sounds. No stridor. No wheezing or rhonchi.      Comments: Frequent dry cough   Abdominal:      General: Abdomen is flat.   Musculoskeletal:         General: Normal range of motion.      Cervical back: Normal range of motion and neck supple.   Skin:     General: Skin is warm and dry.   Neurological:      General: No focal deficit present.      Mental Status: He is alert and oriented to person, place, and time. Mental status is at baseline.   Psychiatric:         Mood and Affect: Mood normal.         Behavior: Behavior normal.         Thought Content: Thought content normal.                   "

## 2025-03-06 NOTE — PROGRESS NOTES
[facility]        NAME: Jae Nowak is a 44 y.o. male  : 1980    MRN: [id number]  DATE: 2025  TIME: 3:30 PM    Assessment and Plan   Respiratory infection [J98.8]  1. Respiratory infection  benzonatate (TESSALON PERLES) 100 mg capsule            Patient Instructions       Follow up with PCP in 3-5 days.  Proceed to  ER if symptoms worsen.    If tests have been performed at [facility], our office will contact you with results if changes need to be made to the care plan discussed with you at the visit.  [facility]    Chief Complaint     Chief Complaint   Patient presents with    Cough     Started about a week ago with nasal congestion, sore throat, denies fever, and he's been taking Tylenol for relief    Patient presents with symptoms and examination consistent with a viral respiratory infection. Provided with a prescription for tessalon to help the symptoms of dry cough. Explained to patient the length of viral illnesses can be 7-14 days.Also explained to patient he is to many days into viral illness for testing.  Explained symptoms of a bacterial infection and signs and symptoms to watch for.     History of Present Illness     Pt reports sore throat that started 1 week ago and turned into a cough and congestion. The sore throat has now resolved . He reports continued dry cough and nasal congestion. He denies sinus pain/pressure.  Pt denies fevers/nausea/vomiting/sob/cp    Cough  The current episode started in the past 7 days. The problem has been unchanged. Associated symptoms include rhinorrhea. Pertinent negatives include no chest pain, ear congestion, ear pain, fever, headaches, heartburn, myalgias, nasal congestion, postnasal drip, sore throat, shortness of breath or wheezing. He has tried OTC cough suppressant and rest for the symptoms. The treatment provided mild relief. There is no history of asthma.       Review of Systems   Review of  Systems   Constitutional:  Negative for fever.   HENT:  Positive for rhinorrhea. Negative for ear pain, postnasal drip, sneezing and sore throat.    Eyes:  Negative for pain and discharge.   Respiratory:  Positive for cough (dry frequent cough). Negative for chest tightness, shortness of breath and wheezing.    Cardiovascular:  Negative for chest pain.   Gastrointestinal:  Negative for abdominal pain, heartburn, nausea and vomiting.   Musculoskeletal:  Negative for arthralgias, myalgias and neck pain.   Neurological:  Negative for headaches.         Current Medications       Current Outpatient Medications:     benzonatate (TESSALON PERLES) 100 mg capsule, Take 1 capsule (100 mg total) by mouth 3 (three) times a day as needed for cough, Disp: 20 capsule, Rfl: 0    co-enzyme Q-10 30 mg, Take 30 mg by mouth daily, Disp: , Rfl:     Omega-3 Fatty Acids (Fish Oil) 300 MG CAPS, Take by mouth, Disp: , Rfl:     rosuvastatin (CRESTOR) 5 mg tablet, TAKE 1 TABLET (5 MG TOTAL) BY MOUTH DAILY., Disp: 90 tablet, Rfl: 0    Current Allergies     Allergies as of 03/06/2025    (No Known Allergies)            The following portions of the patient's history were reviewed and updated as appropriate: allergies, current medications, past family history, past medical history, past social history, past surgical history and problem list.     Past Medical History:   Diagnosis Date    Colon polyp        Past Surgical History:   Procedure Laterality Date    CIRCUMCISION      COLONOSCOPY      SHOULDER SURGERY Left 1998    WISDOM TOOTH EXTRACTION         Family History   Problem Relation Age of Onset    Macular degeneration Mother     Heart attack Father     Clotting disorder Father     Heart disease Father     Diabetes Maternal Grandmother     Colon cancer Neg Hx          Medications have been verified.        Objective   /66 (BP Location: Left arm, Patient Position: Sitting, Cuff Size: Large)   Pulse 80   Temp 97.8 °F (36.6 °C)  "(Tympanic)   Resp 16   Ht 5' 10\" (1.778 m)   Wt 86.2 kg (190 lb)   SpO2 99%   BMI 27.26 kg/m²   No LMP for male patient.       Physical Exam     Physical Exam  Constitutional:       General: He is not in acute distress.     Appearance: Normal appearance. He is normal weight.   HENT:      Head: Normocephalic and atraumatic.      Right Ear: Tympanic membrane, ear canal and external ear normal.      Left Ear: Tympanic membrane, ear canal and external ear normal.      Nose: Congestion and rhinorrhea present.      Mouth/Throat:      Mouth: Mucous membranes are moist.      Pharynx: No oropharyngeal exudate or posterior oropharyngeal erythema.   Eyes:      Extraocular Movements: Extraocular movements intact.      Conjunctiva/sclera: Conjunctivae normal.      Pupils: Pupils are equal, round, and reactive to light.   Cardiovascular:      Rate and Rhythm: Normal rate.   Pulmonary:      Effort: Pulmonary effort is normal. No respiratory distress.      Breath sounds: Normal breath sounds. No stridor. No wheezing or rhonchi.      Comments: Frequent dry cough   Abdominal:      General: Abdomen is flat.   Musculoskeletal:         General: Normal range of motion.      Cervical back: Normal range of motion and neck supple.   Skin:     General: Skin is warm and dry.   Neurological:      General: No focal deficit present.      Mental Status: He is alert and oriented to person, place, and time. Mental status is at baseline.   Psychiatric:         Mood and Affect: Mood normal.         Behavior: Behavior normal.         Thought Content: Thought content normal.                   "

## 2025-05-12 NOTE — PROGRESS NOTES
Cardiology Follow Up    Jae Nowak  1980  [id number]  [facility] [place]  [address]  [place] [id number] [phone]    Assessment & Plan  Hyperlipidemia, unspecified hyperlipidemia type  8/14/24  TG 50 HDL 62   Continue on Crestor 5mg daily   Heart healthy diet  Fasting lipid panel in the near future   Elevated coronary artery calcium score  8/14/23 coronary calcium score 130:, , Left  circumflex 0,  PDA 0.  Denies CP  Continue on Crestor 5mg daily   Fish oil daily       Interval History:   Mr [place] Nowak presents to our office for a follow up visit. He is feeling well and offers no complaints of chest pain, palpitations, shortness of breath lightheadedness or dizziness.  He has lost 20 pounds doing online HIT training three times a week.  [place] consumes a heart healthy diet more rarely eats red meat.      Medical History   Primary Cardiologist Dr Boss  Chest tightness  Palpitations  Elevated Coronary artery Calcium score 130  Hx of lower GIB  Family hx of early CAD   Hyperlipidemia 8/14/24  TG 50 HDL 62       9/25/23 TTE:   Left ventricular cavity size normal wall thickness normal LVEF 60% systolic function normal diastolic function normal.  No significant valvular disease    9/25/23 exercise stress test 17.2 METS no ST deviation noted during stress.  No arrhythmia during recovery EKG negative for ischemia.  Patient Active Problem List   Diagnosis    C6 radiculopathy    Rectal bleeding    Personal history of colonic polyps    Internal hemorrhoids    PONV (postoperative nausea and vomiting)    De Quervain's tenosynovitis, right    Piriformis syndrome, right    Heart palpitations    Chest tightness    Elevated coronary artery calcium score     Past Medical History:   Diagnosis Date    Colon polyp      Social History     Socioeconomic History    Marital status: /Civil Union      Spouse name: Not on file    Number of children: Not on file    Years of education: Not on file    Highest education level: Not on file   Occupational History    Occupation:    Tobacco Use    Smoking status: Never     Passive exposure: Yes    Smokeless tobacco: Never    Tobacco comments:     I smoke 2 or 3 cigars a year.   Vaping Use    Vaping status: Former    Substances: THC   Substance and Sexual Activity    Alcohol use: Yes     Alcohol/week: 6.0 standard drinks of alcohol     Types: 6 Cans of beer per week     Comment: Im a casual drinker.    Drug use: Never     Comment: No use    Sexual activity: Yes     Partners: Female     Birth control/protection: Surgical     Comment: Vasectomy   Other Topics Concern    Not on file   Social History Narrative    Always uses seatbelts    Drinks coffee 2 cups per day     Social Drivers of Health     Financial Resource Strain: Not on file   Food Insecurity: Not on file   Transportation Needs: Not on file   Physical Activity: Not on file   Stress: Not on file   Social Connections: Not on file   Intimate Partner Violence: Not on file   Housing Stability: Not on file      Family History   Problem Relation Age of Onset    Macular degeneration Mother     Heart attack Father     Clotting disorder Father     Heart disease Father     Diabetes Maternal Grandmother     Colon cancer Neg Hx      Past Surgical History:   Procedure Laterality Date    CIRCUMCISION      COLONOSCOPY      SHOULDER SURGERY Left 1998    WISDOM TOOTH EXTRACTION         Current Outpatient Medications:     benzonatate (TESSALON PERLES) 100 mg capsule, Take 1 capsule (100 mg total) by mouth 3 (three) times a day as needed for cough, Disp: 20 capsule, Rfl: 0    co-enzyme Q-10 30 mg, Take 30 mg by mouth daily, Disp: , Rfl:     Omega-3 Fatty Acids (Fish Oil) 300 MG CAPS, Take by mouth, Disp: , Rfl:     rosuvastatin (CRESTOR) 5 mg tablet, TAKE 1 TABLET (5 MG TOTAL) BY MOUTH DAILY., Disp: 90 tablet, Rfl:  0  No Known Allergies    Labs:  No visits with results within 2 Month(s) from this visit.   Latest known visit with results is:   Office Visit on 07/26/2024   Component Date Value    Total Cholesterol 08/14/2024 177     HDL 08/14/2024 62     Triglycerides 08/14/2024 50     LDL Calculated 08/14/2024 102 (H)     Chol HDLC Ratio 08/14/2024 2.9     Non-HDL Cholesterol 08/14/2024 115     Glucose, Random 08/14/2024 81     BUN 08/14/2024 14     Creatinine 08/14/2024 0.91     eGFR 08/14/2024 107     SL AMB BUN/CREATININE RA* 08/14/2024 SEE NOTE:     Sodium 08/14/2024 139     Potassium 08/14/2024 4.5     Chloride 08/14/2024 103     CO2 08/14/2024 27     Calcium 08/14/2024 9.8     Protein, Total 08/14/2024 7.2     Albumin 08/14/2024 4.8     Globulin 08/14/2024 2.4     Albumin/Globulin Ratio 08/14/2024 2.0     TOTAL BILIRUBIN 08/14/2024 0.5     Alkaline Phosphatase 08/14/2024 46     AST 08/14/2024 22     ALT 08/14/2024 18     White Blood Cell Count 08/14/2024 4.6     Red Blood Cell Count 08/14/2024 4.64     Hemoglobin 08/14/2024 14.4     HCT 08/14/2024 42.3     MCV 08/14/2024 91.2     MCH 08/14/2024 31.0     MCHC 08/14/2024 34.0     RDW 08/14/2024 11.9     Platelet Count 08/14/2024 228     SL AMB MPV 08/14/2024 11.8     Neutrophils (Absolute) 08/14/2024 2,300     Lymphocytes (Absolute) 08/14/2024 1,711     Monocytes (Absolute) 08/14/2024 460     Eosinophils (Absolute) 08/14/2024 87     Basophils ABS 08/14/2024 41     Neutrophils 08/14/2024 50     Lymphocytes 08/14/2024 37.2     Monocytes 08/14/2024 10.0     Eosinophils 08/14/2024 1.9     Basophils PCT 08/14/2024 0.9     TSH W/RFX TO FREE T4 08/14/2024 1.88     Color UA 08/14/2024 YELLOW     Urine Appearance 08/14/2024 CLEAR     Specific Gravity 08/14/2024 1.015     Ph 08/14/2024 6.0     Glucose, Urine 08/14/2024 NEGATIVE     Bilirubin, Urine 08/14/2024 NEGATIVE     Ketone, Urine 08/14/2024 TRACE (A)     Blood, Urine 08/14/2024 NEGATIVE     Protein, Urine 08/14/2024 NEGATIVE      Nitrites Urine 08/14/2024 NEGATIVE     Leukocyte Esterase 08/14/2024 NEGATIVE      Imaging: No results found.    Review of Systems:  Review of Systems   All other systems reviewed and are negative.      Physical Exam:  Physical Exam  Vitals reviewed.   Constitutional:       Appearance: Normal appearance.   HENT:      Head: Normocephalic.   Neck:      Vascular: No carotid bruit.   Cardiovascular:      Rate and Rhythm: Normal rate and regular rhythm.      Pulses: Normal pulses.      Heart sounds: Normal heart sounds.      Comments: Ectopic beat   Pulmonary:      Effort: Pulmonary effort is normal.      Breath sounds: Normal breath sounds.   Abdominal:      General: Abdomen is flat.   Musculoskeletal:         General: Normal range of motion.      Cervical back: Normal range of motion and neck supple.      Right lower leg: No edema.      Left lower leg: No edema.   Skin:     General: Skin is warm and dry.      Capillary Refill: Capillary refill takes less than 2 seconds.   Neurological:      General: No focal deficit present.      Mental Status: He is alert and oriented to person, place, and time.   Psychiatric:         Mood and Affect: Mood normal.         Behavior: Behavior normal.

## 2025-05-13 ENCOUNTER — OFFICE VISIT (OUTPATIENT)
Dept: CARDIOLOGY CLINIC | Facility: CLINIC | Age: 45
End: 2025-05-13
Payer: COMMERCIAL

## 2025-05-13 VITALS
BODY MASS INDEX: 26.9 KG/M2 | WEIGHT: 187.9 LBS | HEART RATE: 62 BPM | SYSTOLIC BLOOD PRESSURE: 112 MMHG | OXYGEN SATURATION: 99 % | HEIGHT: 70 IN | DIASTOLIC BLOOD PRESSURE: 72 MMHG

## 2025-05-13 DIAGNOSIS — E78.5 HYPERLIPIDEMIA, UNSPECIFIED HYPERLIPIDEMIA TYPE: Primary | ICD-10-CM

## 2025-05-13 DIAGNOSIS — R93.1 ELEVATED CORONARY ARTERY CALCIUM SCORE: ICD-10-CM

## 2025-05-13 PROCEDURE — 99214 OFFICE O/P EST MOD 30 MIN: CPT | Performed by: NURSE PRACTITIONER

## 2025-05-13 PROCEDURE — 93000 ELECTROCARDIOGRAM COMPLETE: CPT | Performed by: NURSE PRACTITIONER

## 2025-05-13 NOTE — ASSESSMENT & PLAN NOTE
8/14/23 coronary calcium score 130:, , Left  circumflex 0,  PDA 0.  Denies CP  Continue on Crestor 5mg daily   Fish oil daily

## 2025-05-16 LAB
ATRIAL RATE: 62 BPM
P AXIS: 66 DEGREES
PR INTERVAL: 196 MS
QRS AXIS: 58 DEGREES
QRSD INTERVAL: 92 MS
QT INTERVAL: 398 MS
QTC INTERVAL: 403 MS
T WAVE AXIS: 43 DEGREES
VENTRICULAR RATE: 62 BPM

## 2025-05-25 LAB
CHOLEST SERPL-MCNC: 163 MG/DL
CHOLEST/HDLC SERPL: 2.4 (CALC)
HDLC SERPL-MCNC: 68 MG/DL
LDLC SERPL CALC-MCNC: 82 MG/DL (CALC)
NONHDLC SERPL-MCNC: 95 MG/DL (CALC)
TRIGL SERPL-MCNC: 44 MG/DL

## 2025-05-27 ENCOUNTER — RESULTS FOLLOW-UP (OUTPATIENT)
Dept: CARDIOLOGY CLINIC | Facility: CLINIC | Age: 45
End: 2025-05-27

## 2025-06-27 DIAGNOSIS — R07.89 CHEST TIGHTNESS: ICD-10-CM

## 2025-06-27 DIAGNOSIS — I25.10 CORONARY ARTERY CALCIFICATION: ICD-10-CM

## 2025-06-27 RX ORDER — ROSUVASTATIN CALCIUM 5 MG/1
5 TABLET, COATED ORAL DAILY
Qty: 90 TABLET | Refills: 1 | Status: SHIPPED | OUTPATIENT
Start: 2025-06-27